# Patient Record
Sex: FEMALE | Race: WHITE | NOT HISPANIC OR LATINO | Employment: FULL TIME | URBAN - METROPOLITAN AREA
[De-identification: names, ages, dates, MRNs, and addresses within clinical notes are randomized per-mention and may not be internally consistent; named-entity substitution may affect disease eponyms.]

---

## 2017-05-03 ENCOUNTER — ALLSCRIPTS OFFICE VISIT (OUTPATIENT)
Dept: OTHER | Facility: OTHER | Age: 57
End: 2017-05-03

## 2017-05-03 ENCOUNTER — LAB REQUISITION (OUTPATIENT)
Dept: LAB | Facility: HOSPITAL | Age: 57
End: 2017-05-03
Payer: COMMERCIAL

## 2017-05-03 DIAGNOSIS — Z01.419 ENCOUNTER FOR GYNECOLOGICAL EXAMINATION WITHOUT ABNORMAL FINDING: ICD-10-CM

## 2017-05-03 PROCEDURE — 88175 CYTOPATH C/V AUTO FLUID REDO: CPT | Performed by: OBSTETRICS & GYNECOLOGY

## 2017-05-03 PROCEDURE — 87624 HPV HI-RISK TYP POOLED RSLT: CPT | Performed by: OBSTETRICS & GYNECOLOGY

## 2017-05-04 ENCOUNTER — GENERIC CONVERSION - ENCOUNTER (OUTPATIENT)
Dept: OTHER | Facility: OTHER | Age: 57
End: 2017-05-04

## 2017-05-08 LAB — HPV RRNA GENITAL QL NAA+PROBE: NORMAL

## 2017-05-09 ENCOUNTER — GENERIC CONVERSION - ENCOUNTER (OUTPATIENT)
Dept: OTHER | Facility: OTHER | Age: 57
End: 2017-05-09

## 2017-05-09 LAB
LAB AP GYN PRIMARY INTERPRETATION: NORMAL
Lab: NORMAL

## 2017-06-02 ENCOUNTER — OFFICE VISIT (OUTPATIENT)
Dept: URGENT CARE | Facility: MEDICAL CENTER | Age: 57
End: 2017-06-02
Payer: COMMERCIAL

## 2017-06-02 PROCEDURE — 99204 OFFICE O/P NEW MOD 45 MIN: CPT

## 2018-01-12 VITALS
SYSTOLIC BLOOD PRESSURE: 100 MMHG | BODY MASS INDEX: 17.41 KG/M2 | WEIGHT: 98.25 LBS | DIASTOLIC BLOOD PRESSURE: 60 MMHG | HEIGHT: 63 IN

## 2018-01-12 NOTE — RESULT NOTES
Verified Results  MAMMO SCREENING BILATERAL W 3D & CAD [id number] 11:07AM Sil Anthony Order Number: [id number]     Test Name Result Flag Reference   MAMMO SCREENING BILATERAL W 3D & CAD (Report)     Patient History:   Patient is postmenopausal    No known family history of cancer  Benign excisional biopsy of the left breast, 2009  Benign    excisional biopsy of the right breast, 2007  Taking estrogen for 5 months  Patient has never smoked  Patient's BMI is 17 7  Reason for exam: screening (asymptomatic)  Mammo Screening Bilateral W DBT and CAD: August 1, 2016 - Check    In #: [de-identified]   2D/3D Procedure   3D views: Bilateral MLO view(s) were taken  2D views: Bilateral CC view(s) were taken  Technologist: LEE ANN Sanchez (LEE ANN)(M)   Prior study comparison: January 16, 2013, mammogram  November 5, 2010, mammogram  October 25, 2010, mammogram  January 19, 2009, mammogram  December 15, 2008, mammogram  July 11, 2008,    mammogram  December 21, 2007, mammogram  November 30, 2007,    mammogram      The breast tissue is heterogeneously dense, potentially limiting    the sensitivity of mammography  Patient risk, included in this    report, assists in determining the appropriate screening regimen    (such as 3-D mammography or the inclusion of automated breast    ultrasound or MRI)  3-D mammography may also remain indicated as    screening  A combination of mediolateral oblique 3-D tomographic slices as    well as standard two-dimensional orthogonal images were obtained  No dominant soft tissue mass, architectural distortion or    suspicious calcifications are noted in either breast   The skin    and nipple structures are within normal limits  Scattered benign   appearing calcifications are noted  Prior biopsy clip noted    bilaterally  No significant changes when compared with prior studies       ASSESSMENT: BiRad:2 - Benign     Recommendation:   Routine screening mammogram of both breasts in 1 year  A    reminder letter will be scheduled  8-10% of cancers will be missed on mammography  Management of a    palpable abnormality must be based on clinical grounds  Patients    will be notified of their results via letter from our facility  Accredited by [address] [place] [address] of Radiology and FDA       Transcription Location: LEE ANN Cast 98: [id number]     Risk Value(s):   Tyrer-Cuzick 10 Year: 3 482%, Tyrer-Cuzick Lifetime: 9 487%,    Myriad Table: 1 5%, AUDREY 5 Year: 2 4%, NCI Lifetime: 13 4%

## 2018-01-29 ENCOUNTER — OFFICE VISIT (OUTPATIENT)
Dept: URGENT CARE | Facility: MEDICAL CENTER | Age: 58
End: 2018-01-29
Payer: COMMERCIAL

## 2018-01-29 VITALS
HEART RATE: 86 BPM | TEMPERATURE: 97.8 F | OXYGEN SATURATION: 98 % | HEIGHT: 63 IN | RESPIRATION RATE: 16 BRPM | SYSTOLIC BLOOD PRESSURE: 110 MMHG | DIASTOLIC BLOOD PRESSURE: 70 MMHG | BODY MASS INDEX: 15.95 KG/M2 | WEIGHT: 90 LBS

## 2018-01-29 DIAGNOSIS — J20.9 ACUTE BRONCHITIS, UNSPECIFIED ORGANISM: Primary | ICD-10-CM

## 2018-01-29 PROCEDURE — 99213 OFFICE O/P EST LOW 20 MIN: CPT | Performed by: PHYSICIAN ASSISTANT

## 2018-01-29 RX ORDER — LORATADINE 10 MG/1
10 TABLET ORAL
COMMUNITY
End: 2019-02-06 | Stop reason: ALTCHOICE

## 2018-01-29 RX ORDER — QUETIAPINE FUMARATE 25 MG/1
TABLET, FILM COATED ORAL
COMMUNITY
Start: 2018-01-16 | End: 2018-05-07 | Stop reason: ALTCHOICE

## 2018-01-29 RX ORDER — SERTRALINE HYDROCHLORIDE 100 MG/1
75 TABLET, FILM COATED ORAL ONCE
COMMUNITY
Start: 2018-01-11 | End: 2019-07-15

## 2018-01-29 RX ORDER — AZITHROMYCIN 250 MG/1
TABLET, FILM COATED ORAL
Qty: 6 TABLET | Refills: 0 | Status: SHIPPED | OUTPATIENT
Start: 2018-01-29 | End: 2018-02-02

## 2018-01-29 RX ORDER — ESTRADIOL 10 UG/1
INSERT VAGINAL AS NEEDED
COMMUNITY
Start: 2017-01-24 | End: 2018-10-13 | Stop reason: SDUPTHER

## 2018-01-29 RX ORDER — LORAZEPAM 0.5 MG/1
0.5 TABLET ORAL
COMMUNITY
Start: 2018-01-11 | End: 2018-05-07 | Stop reason: ALTCHOICE

## 2018-01-29 RX ORDER — BENZONATATE 100 MG/1
100 CAPSULE ORAL 3 TIMES DAILY PRN
Qty: 20 CAPSULE | Refills: 0 | Status: SHIPPED | OUTPATIENT
Start: 2018-01-29 | End: 2018-05-07 | Stop reason: ALTCHOICE

## 2018-01-29 NOTE — PROGRESS NOTES
Assessment/Plan:  1  Acute bronchitis  -take azithromycin/Tessalon Perles as directed  -increase fluids  -follow up with PCP if no improvement within 5 days  -go to ER with worsening symptoms, difficulty breathing or any signs of distress       Diagnoses and all orders for this visit:    Acute bronchitis, unspecified organism  -     azithromycin (ZITHROMAX) 250 mg tablet; Take 2 tablets today then 1 tablet daily x 4 days  -     benzonatate (TESSALON PERLES) 100 mg capsule; Take 1 capsule (100 mg total) by mouth 3 (three) times a day as needed for cough    Other orders  -     aspirin 81 MG tablet; Take by mouth  -     Multiple Vitamin (MULTIVITAMINS PO); Take by mouth  -     CycloSPORINE (RESTASIS OP); Apply to eye  -     Estradiol (VAGIFEM) 10 MCG TABS; Insert into the vagina  -     loratadine (CLARITIN) 10 mg tablet; Take 10 mg by mouth  -     LORazepam (ATIVAN) 0 5 mg tablet; Take 0 5 mg by mouth  -     QUEtiapine (SEROquel) 25 mg tablet; Take 1-2 tablets at night as needed for sleep  -     sertraline (ZOLOFT) 100 mg tablet; Take 100 mg by mouth          Subjective:      Patient ID: Valdez Mckee is a 59 y o  female  Patient is a 52-year-old female presents today for evaluation of a cough for the past 10 days  Patient states that it started as an upper respiratory infection however it has gotten worse  Patient states that yesterday and today she is bringing up green mucus  Patient states that she has tried taking over-the-counter medications without much relief  Patient states that she had a low-grade fever on Saturday  Patient also admits to having some nasal congestion as well  The patient states that she works at [employer]  Patient states that she had something similar a few months ago and was put on a Z-Jimmie which really improved her symptoms          The following portions of the patient's history were reviewed and updated as appropriate: past family history, past surgical history and problem list     Review of Systems   Constitutional: Negative for chills and fever  HENT: Positive for rhinorrhea  Negative for ear pain and sore throat  Eyes: Negative for discharge  Respiratory: Positive for cough  Negative for shortness of breath  Cardiovascular: Negative for chest pain  Gastrointestinal: Negative for abdominal pain  Musculoskeletal: Negative for arthralgias  Neurological: Negative for headaches  Objective:     Physical Exam   Constitutional: She is oriented to person, place, and time  She appears well-developed and well-nourished  No distress  HENT:   Head: Normocephalic and atraumatic  Right Ear: External ear normal    Left Ear: External ear normal    Nose: Nose normal    Mouth/Throat: Oropharynx is clear and moist  No oropharyngeal exudate  Eyes: Conjunctivae and EOM are normal  Pupils are equal, round, and reactive to light  Neck: Normal range of motion  Neck supple  Cardiovascular: Normal rate, regular rhythm and normal heart sounds  Exam reveals no gallop and no friction rub  No murmur heard  Pulmonary/Chest: Effort normal and breath sounds normal  She has no wheezes  She has no rales  Lymphadenopathy:     She has no cervical adenopathy  Neurological: She is alert and oriented to person, place, and time  Skin: Skin is warm and dry  Psychiatric: She has a normal mood and affect  Patient Instructions     Acute Bronchitis   AMBULATORY CARE:   Acute bronchitis  is swelling and irritation in the air passages of your lungs  This irritation may cause you to cough or have other breathing problems  Acute bronchitis often starts because of another illness, such as a cold or the flu  The illness spreads from your nose and throat to your windpipe and airways  Bronchitis is often called a chest cold  Acute bronchitis lasts about 3 to 6 weeks and is usually not a serious illness  Your cough can last for several weeks     You may have any of the following symptoms:   · A cough with sputum that may be clear, yellow, or green    · Feeling more tired than usual, and body aches    · A fever and chills    · Wheezing when you breathe    · A tight chest or pain when you breathe or cough  Seek care immediately if:   · You cough up blood  · Your lips or fingernails turn blue  · You feel like you are not getting enough air when you breathe  Contact your healthcare provider if:   · You have a fever  · Your breathing problems do not go away or get worse  · Your cough does not get better within 4 weeks  · You have questions or concerns about your condition or care  Self-care:   · Get more rest   Rest helps your body to heal  Slowly start to do more each day  Rest when you feel it is needed  · Avoid irritants in the air  Avoid chemicals, fumes, and dust  Wear a face mask if you must work around dust or fumes  Stay inside on days when air pollution levels are high  If you have allergies, stay inside when pollen counts are high  Do not use aerosol products, such as spray-on deodorant, bug spray, and hair spray  · Do not smoke or be around others who smoke  Nicotine and other chemicals in cigarettes and cigars damages the cilia that move mucus out of your lungs  Ask your healthcare provider for information if you currently smoke and need help to quit  E-cigarettes or smokeless tobacco still contain nicotine  Talk to your healthcare provider before you use these products  · Drink liquids as directed  Liquids help keep your air passages moist and help you cough up mucus  You may need to drink more liquids when you have acute bronchitis  Ask how much liquid to drink each day and which liquids are best for you  · Use a humidifier or vaporizer  Use a cool mist humidifier or a vaporizer to increase air moisture in your home  This may make it easier for you to breathe and help decrease your cough    Prevent acute bronchitis by doing the following: · Get the vaccinations you need  Ask your healthcare provider if you should get vaccinated against the flu or pneumonia  · Prevent the spread of germs  You can decrease your risk of acute bronchitis and other illnesses by doing the following:     [facility] your hands often with soap and water  Carry germ-killing hand lotion or gel with you  You can use the lotion or gel to clean your hands when soap and water are not available  ¨ Do not touch your eyes, nose, or mouth unless you have washed your hands first     ¨ Always cover your mouth when you cough to prevent the spread of germs  It is best to cough into a tissue or your shirt sleeve instead of into your hand  Ask those around you cover their mouths when they cough  ¨ Try to avoid people who have a cold or the flu  If you are sick, stay away from others as much as possible  Medicines: Your healthcare provider may  give you any of the following:  · Ibuprofen or acetaminophen  are medicines that help lower your fever  They are available without a doctor's order  Ask your healthcare provider which medicine is right for you  Ask how much to take and how often to take it  Follow directions  These medicines can cause stomach bleeding if not taken correctly  Ibuprofen can cause kidney damage  Do not take ibuprofen if you have kidney disease, an ulcer, or allergies to aspirin  Acetaminophen can cause liver damage  Do not take more than 4,000 milligrams in 24 hours  · Decongestants  help loosen mucus in your lungs and make it easier to cough up  This can help you breathe easier  · Cough suppressants  decrease your urge to cough  If your cough produces mucus, do not take a cough suppressant unless your healthcare provider tells you to  Your healthcare provider may suggest that you take a cough suppressant at night so you can rest     · Inhalers  may be given   Your healthcare provider may give you one or more inhalers to help you breathe easier and cough less  An inhaler gives your medicine to open your airways  Ask your healthcare provider to show you how to use your inhaler correctly  Follow up with your healthcare provider as directed:  Write down questions you have so you will remember to ask them during your follow-up visits  © 2017 [employer] Information is for End User's use only and may not be sold, redistributed or otherwise used for commercial purposes  All illustrations and images included in CareNotes® are the copyrighted property of [employer] , [employer]  or Hong Carranza  The above information is an  only  It is not intended as medical advice for individual conditions or treatments  Talk to your doctor, nurse or pharmacist before following any medical regimen to see if it is safe and effective for you

## 2018-01-29 NOTE — PATIENT INSTRUCTIONS

## 2018-05-07 ENCOUNTER — ANNUAL EXAM (OUTPATIENT)
Dept: GYNECOLOGY | Facility: CLINIC | Age: 58
End: 2018-05-07
Payer: COMMERCIAL

## 2018-05-07 VITALS
HEART RATE: 76 BPM | HEIGHT: 63 IN | WEIGHT: 99.8 LBS | DIASTOLIC BLOOD PRESSURE: 68 MMHG | BODY MASS INDEX: 17.68 KG/M2 | SYSTOLIC BLOOD PRESSURE: 112 MMHG

## 2018-05-07 DIAGNOSIS — Z12.31 ENCOUNTER FOR SCREENING MAMMOGRAM FOR BREAST CANCER: ICD-10-CM

## 2018-05-07 DIAGNOSIS — Z01.419 ENCOUNTER FOR ANNUAL ROUTINE GYNECOLOGICAL EXAMINATION: Primary | ICD-10-CM

## 2018-05-07 PROCEDURE — S0612 ANNUAL GYNECOLOGICAL EXAMINA: HCPCS | Performed by: OBSTETRICS & GYNECOLOGY

## 2018-05-07 NOTE — PROGRESS NOTES
Assessment/Plan:  Normal gynecological exam  CoTesting   Osteoporosis- observed, Rx would be declined  Vaginal Dryness- Vagifem 1/wk  Return to office in 1 year  Annual 3-D mammography  Exercise 3 days a week- she does 5  Calcium 1200 mg daily with vitamin D  Colonoscopy -   Diagnoses and all orders for this visit:    Encounter for annual routine gynecological examination    Encounter for screening mammogram for breast cancer          Subjective:      Patient ID: Paulette Garrido is a 59 y o  female  Serene Aubries is here for an annual visit  She has no gynecologic complaints  If the Vagifem is not used she experiences discharge and dryness  The following portions of the patient's history were reviewed and updated as appropriate:   She  has a past medical history of Migraine and Osteoporosis  PMH:  G3, P2; SAVD x 2, , , Spont Ab   Vulvitis 1/10- Lidex cream  R Breast Bx   L Stereotactic Bx-   Dry Eyes   Lyme's Disease   Osteoporosis- Boniva for 1 yr, d/c'd due to SE's- flu-like sx's   Declines BMD- would not treat  Dyspareunia/Vaginal Dryness-  Vagifem SE's 15, trial of Estrace ', back to Vagifem 1/wk   Reactive depression - skilled nursing/'s illness  Patient Active Problem List    Diagnosis Date Noted    Encounter for annual routine gynecological examination 2018    Encounter for screening mammogram for breast cancer 2018     She  has a past surgical history that includes [place] tooth extraction  Her family history includes ALS in her mother; Hypertension in her father  FH:  Mother  age 66 from [address], she also had hypercholesterolemia  Father had hypercholesterolemia   MGF- CVA  PGF- HD  PGM- Colon Ca  Several females have Osteoporosis- no Fx's  She  reports that she has never smoked  She has never used smokeless tobacco  She reports that she does not drink alcohol or use drugs  SH:  Internist   Tara Chuck Ruby Phillip has 2 children and expecting her 2rd, Jay Kang (who I delivered) is  and teaching in [facility], she is the debate   Went to [facility] and [place] for month each  The briefly moved to [place] in an apartment to be close to [facility]  Sarah Browne has adrenal insufficiency and associated depression  [facility] plans changed  Madina Lozano returned to [facility] working 3 days a week  Close friend is in hospice  Current Outpatient Prescriptions   Medication Sig Dispense Refill    aspirin 81 MG tablet Take by mouth      benzonatate (TESSALON PERLES) 100 mg capsule Take 1 capsule (100 mg total) by mouth 3 (three) times a day as needed for cough 20 capsule 0    CycloSPORINE (RESTASIS OP) Apply to eye      Estradiol (VAGIFEM) 10 MCG TABS Insert into the vagina      loratadine (CLARITIN) 10 mg tablet Take 10 mg by mouth      LORazepam (ATIVAN) 0 5 mg tablet Take 0 5 mg by mouth      Multiple Vitamin (MULTIVITAMINS PO) Take by mouth      QUEtiapine (SEROquel) 25 mg tablet Take 1-2 tablets at night as needed for sleep      sertraline (ZOLOFT) 100 mg tablet Take 100 mg by mouth       No current facility-administered medications for this visit  Current Outpatient Prescriptions on File Prior to Visit   Medication Sig    aspirin 81 MG tablet Take by mouth    benzonatate (TESSALON PERLES) 100 mg capsule Take 1 capsule (100 mg total) by mouth 3 (three) times a day as needed for cough    CycloSPORINE (RESTASIS OP) Apply to eye    Estradiol (VAGIFEM) 10 MCG TABS Insert into the vagina    loratadine (CLARITIN) 10 mg tablet Take 10 mg by mouth    LORazepam (ATIVAN) 0 5 mg tablet Take 0 5 mg by mouth    Multiple Vitamin (MULTIVITAMINS PO) Take by mouth    QUEtiapine (SEROquel) 25 mg tablet Take 1-2 tablets at night as needed for sleep    sertraline (ZOLOFT) 100 mg tablet Take 100 mg by mouth     No current facility-administered medications on file prior to visit        She is allergic to sulfa antibiotics and sulfamethoxazole-trimethoprim       Review of Systems   Constitutional: Negative for activity change, appetite change, fatigue and unexpected weight change  Eyes: Negative for visual disturbance  Respiratory: Negative for cough, chest tightness, shortness of breath and wheezing  Cardiovascular: Negative for chest pain, palpitations and leg swelling  Breast: Patient denies tenderness, nipple discharge, masses, or erythema  Gastrointestinal: Negative for abdominal distention, abdominal pain, blood in stool, constipation, diarrhea, nausea and vomiting  Endocrine: Negative for cold intolerance and heat intolerance  Genitourinary: Negative for decreased urine volume, difficulty urinating, dyspareunia, dysuria, frequency, hematuria, menstrual problem, pelvic pain, urgency, vaginal bleeding, vaginal discharge and vaginal pain  Musculoskeletal: Negative for arthralgias  Skin: Negative for rash  Neurological: Negative for weakness, light-headedness, numbness and headaches  Hematological: Does not bruise/bleed easily  Psychiatric/Behavioral: Negative for agitation, behavioral problems and sleep disturbance  The patient is not nervous/anxious  Objective: There were no vitals taken for this visit  Physical Exam   Constitutional: She is oriented to person, place, and time  She appears well-developed and well-nourished  HENT:   Head: Normocephalic and atraumatic  Eyes: Conjunctivae and EOM are normal  Pupils are equal, round, and reactive to light  Neck: Normal range of motion  Neck supple  No tracheal deviation present  No thyromegaly present  Cardiovascular: Normal rate, regular rhythm and normal heart sounds  No murmur heard  Pulmonary/Chest: Effort normal and breath sounds normal  No respiratory distress  She has no wheezes  Right breast exhibits no inverted nipple, no mass, no nipple discharge, no skin change and no tenderness   Left breast exhibits no inverted nipple, no mass, no nipple discharge, no skin change and no tenderness  Breasts are symmetrical    Abdominal: Soft  Bowel sounds are normal  She exhibits no distension and no mass  There is no tenderness  Genitourinary: Vagina normal and uterus normal  Rectal exam shows no external hemorrhoid  No breast swelling, tenderness, discharge or bleeding  There is no rash, tenderness or lesion on the right labia  There is no rash, tenderness or lesion on the left labia  Uterus is not deviated, not enlarged and not tender  Cervix exhibits no motion tenderness and no discharge  Right adnexum displays no mass, no tenderness and no fullness  Left adnexum displays no mass, no tenderness and no fullness  Genitourinary Comments: Physiologic atrophy   Musculoskeletal: Normal range of motion  Neurological: She is alert and oriented to person, place, and time  Skin: Skin is warm and dry  Psychiatric: She has a normal mood and affect  Her behavior is normal  Judgment and thought content normal    Nursing note and vitals reviewed

## 2018-08-13 ENCOUNTER — HOSPITAL ENCOUNTER (OUTPATIENT)
Dept: MAMMOGRAPHY | Facility: MEDICAL CENTER | Age: 58
Discharge: HOME | End: 2018-08-13
Payer: COMMERCIAL

## 2018-08-13 DIAGNOSIS — Z12.31 ENCOUNTER FOR SCREENING MAMMOGRAM FOR BREAST CANCER: ICD-10-CM

## 2018-08-13 PROCEDURE — 77067 SCR MAMMO BI INCL CAD: CPT

## 2018-08-13 PROCEDURE — 77063 BREAST TOMOSYNTHESIS BI: CPT

## 2018-10-12 ENCOUNTER — TELEPHONE (OUTPATIENT)
Dept: GYNECOLOGY | Facility: CLINIC | Age: 58
End: 2018-10-12

## 2018-10-12 NOTE — TELEPHONE ENCOUNTER
Pt called for a refill on vagifem 10 mcg tabs  [facility]  It is already in her chart under pharmacy   She was last seen 5/7/18 for an annual exam  Last refilled on 1/29/18

## 2018-10-13 DIAGNOSIS — N95.2 ATROPHIC VAGINITIS: Primary | ICD-10-CM

## 2018-10-13 RX ORDER — ESTRADIOL 10 UG/1
1 INSERT VAGINAL 2 TIMES WEEKLY
Qty: 13 TABLET | Refills: 3 | Status: SHIPPED | OUTPATIENT
Start: 2018-10-15 | End: 2020-06-29 | Stop reason: SDUPTHER

## 2019-01-14 ENCOUNTER — OFFICE VISIT (OUTPATIENT)
Dept: URGENT CARE | Facility: MEDICAL CENTER | Age: 59
End: 2019-01-14
Payer: COMMERCIAL

## 2019-01-14 ENCOUNTER — APPOINTMENT (OUTPATIENT)
Dept: RADIOLOGY | Facility: MEDICAL CENTER | Age: 59
End: 2019-01-14
Payer: COMMERCIAL

## 2019-01-14 VITALS
RESPIRATION RATE: 16 BRPM | BODY MASS INDEX: 18.29 KG/M2 | HEIGHT: 63 IN | TEMPERATURE: 101.7 F | DIASTOLIC BLOOD PRESSURE: 62 MMHG | OXYGEN SATURATION: 97 % | SYSTOLIC BLOOD PRESSURE: 134 MMHG | HEART RATE: 85 BPM | WEIGHT: 103.2 LBS

## 2019-01-14 DIAGNOSIS — R05.9 COUGH: ICD-10-CM

## 2019-01-14 DIAGNOSIS — R91.8 PULMONARY INFILTRATE IN RIGHT LUNG ON CHEST X-RAY: Primary | ICD-10-CM

## 2019-01-14 DIAGNOSIS — R50.9 FEVER, UNSPECIFIED FEVER CAUSE: ICD-10-CM

## 2019-01-14 PROCEDURE — G0383 LEV 4 HOSP TYPE B ED VISIT: HCPCS | Performed by: NURSE PRACTITIONER

## 2019-01-14 PROCEDURE — S9083 URGENT CARE CENTER GLOBAL: HCPCS | Performed by: NURSE PRACTITIONER

## 2019-01-14 PROCEDURE — 71046 X-RAY EXAM CHEST 2 VIEWS: CPT

## 2019-01-14 RX ORDER — LEVOFLOXACIN 750 MG/1
750 TABLET ORAL EVERY 24 HOURS
Qty: 7 TABLET | Refills: 0 | Status: SHIPPED | OUTPATIENT
Start: 2019-01-14 | End: 2019-01-21

## 2019-01-14 RX ORDER — ACETAMINOPHEN 325 MG/1
650 TABLET ORAL ONCE
Status: COMPLETED | OUTPATIENT
Start: 2019-01-14 | End: 2019-01-14

## 2019-01-14 RX ORDER — CETIRIZINE HYDROCHLORIDE 5 MG/1
5 TABLET, CHEWABLE ORAL DAILY
COMMUNITY
End: 2020-06-29

## 2019-01-14 RX ADMIN — ACETAMINOPHEN 650 MG: 325 TABLET ORAL at 09:15

## 2019-01-14 NOTE — PROGRESS NOTES
[email or web address]        NAME: Lita Paez is a 59 y o  female  : 1960    MRN: [id number]  DATE: 2019  TIME: 9:19 AM    Assessment and Plan   Pulmonary infiltrate in right lung on chest x-ray [R91 8]  1  Pulmonary infiltrate in right lung on chest x-ray  levofloxacin (LEVAQUIN) 750 mg tablet   2  Cough  XR chest pa & lateral   3  Fever, unspecified fever cause  acetaminophen (TYLENOL) tablet 650 mg    levofloxacin (LEVAQUIN) 750 mg tablet         Patient Instructions   Tylenol 650 mg given in office, tolerated well  In office chest x ray with suspected R middle/lower lobe infiltrate, await final read  Recommend course of antibiotics at this time  Does not wish to have cough syrup prescribed at this time  May alternate Tylenol and Ibuprofen as needed  Encourage fluids and rest    Saline nasal spray as needed  Humidify bedroom  Salt water gargles  Chloraseptic spray and lozenges as needed  Consider adding anti-histamines daily, ie  Claritin or Zyrtec  Complete course of antibiotics as directed  Follow up with PCP in 3-5 days  Proceed to  ER if symptoms worsen  Chief Complaint     Chief Complaint   Patient presents with    Cold Like Symptoms     Patient here with a dry cough and sinus congestion for 6 days  History of Present Illness       Patient presents in office with 6 days of fevers, chills, R sided chest discomfort (which has improved)  Now dry cough, congestion  Has been taking ibuprofen no prn meds taken today  + flu vaccine this season  No allergies  Nonsmoker  Review of Systems   Review of Systems   Constitutional: Positive for chills, fatigue and fever  HENT: Positive for congestion  Negative for ear pain, rhinorrhea, sinus pain, sinus pressure and sore throat  Respiratory: Positive for cough  Negative for chest tightness, shortness of breath and wheezing  Cardiovascular: Negative  Gastrointestinal: Negative      Musculoskeletal: Positive for myalgias  Skin: Negative for rash  Current Medications       Current Outpatient Prescriptions:     cetirizine (ZyrTEC) 5 MG chewable tablet, Chew 5 mg daily, Disp: , Rfl:     CycloSPORINE (RESTASIS OP), Apply to eye, Disp: , Rfl:     estradiol (VAGIFEM) 10 MCG TABS vaginal tablet, Insert 1 tablet (10 mcg total) into the vagina 2 (two) times a week, Disp: 13 tablet, Rfl: 3    Multiple Vitamin (MULTIVITAMINS PO), Take by mouth, Disp: , Rfl:     sertraline (ZOLOFT) 100 mg tablet, Take 150 mg by mouth once  , Disp: , Rfl:     aspirin 81 MG tablet, Take by mouth, Disp: , Rfl:     levofloxacin (LEVAQUIN) 750 mg tablet, Take 1 tablet (750 mg total) by mouth every 24 hours for 7 days, Disp: 7 tablet, Rfl: 0    loratadine (CLARITIN) 10 mg tablet, Take 10 mg by mouth, Disp: , Rfl:   No current facility-administered medications for this visit  Current Allergies     Allergies as of 01/14/2019 - Reviewed 01/14/2019   Allergen Reaction Noted    Sulfa antibiotics Nausea Only 08/09/2017    Sulfamethoxazole-trimethoprim  03/21/2016            The following portions of the patient's history were reviewed and updated as appropriate: allergies, current medications, past family history, past medical history, past social history, past surgical history and problem list      Past Medical History:   Diagnosis Date    Anxiety     Depression     Migraine     Osteoporosis        Past Surgical History:   Procedure Laterality Date    WISDOM TOOTH EXTRACTION      in her 17's       Family History   Problem Relation Age of Onset    ALS Mother     Hypertension Father          Medications have been verified  Objective   /62   Pulse 85   Temp (!) 101 7 °F (38 7 °C) (Tympanic)   Resp 16   Ht 5' 3" (1 6 m)   Wt 46 8 kg (103 lb 3 2 oz)   LMP  (LMP Unknown)   SpO2 97%   BMI 18 28 kg/m²        Physical Exam     Physical Exam   Constitutional: She is oriented to person, place, and time   Vital signs are normal  She appears well-developed and well-nourished  She is cooperative  Non-toxic appearance  She does not have a sickly appearance  She does not appear ill  No distress  HENT:   Head: Normocephalic and atraumatic  Right Ear: Hearing, tympanic membrane, external ear and ear canal normal    Left Ear: Hearing, tympanic membrane, external ear and ear canal normal    Nose: Mucosal edema and sinus tenderness present  No rhinorrhea  Right sinus exhibits maxillary sinus tenderness and frontal sinus tenderness  Left sinus exhibits maxillary sinus tenderness and frontal sinus tenderness  Mouth/Throat: Uvula is midline, oropharynx is clear and moist and mucous membranes are normal  Normal dentition  No oropharyngeal exudate  Eyes: Pupils are equal, round, and reactive to light  Conjunctivae, EOM and lids are normal  Right eye exhibits no discharge  Left eye exhibits no discharge  Neck: Trachea normal and normal range of motion  No thyroid mass and no thyromegaly present  Cardiovascular: Normal rate, regular rhythm, S1 normal, S2 normal, normal heart sounds, intact distal pulses and normal pulses  Exam reveals no gallop and no friction rub  No murmur heard  Pulmonary/Chest: Effort normal  No respiratory distress  She has decreased breath sounds  She has no wheezes  She has rhonchi in the right lower field  She has no rales  She exhibits no tenderness  Musculoskeletal: Normal range of motion  She exhibits no edema  Lymphadenopathy:     She has no cervical adenopathy  Neurological: She is alert and oriented to person, place, and time  Skin: Skin is warm and dry  No rash noted  She is not diaphoretic  Psychiatric: She has a normal mood and affect  Her behavior is normal  Thought content normal    Nursing note and vitals reviewed  I have reviewed the student's history and physical, I have personally examined the patient and agree with the above stated findings and plan

## 2019-01-14 NOTE — PATIENT INSTRUCTIONS
May alternate Tylenol and Ibuprofen as needed  Encourage fluids and rest    Saline nasal spray as needed  Humidify bedroom  Salt water gargles  Chloraseptic spray and lozenges as needed  Consider adding anti-histamines daily, ie  Claritin or Zyrtec  Complete course of antibiotics as directed  F/U with PCP if symptoms persist/worsen or go to nearest emergency department if any signs of distress  Bacterial Pneumonia   AMBULATORY CARE:   Bacterial pneumonia  is a lung infection caused by bacteria  Your lungs become inflamed and cannot work well  Bacterial pneumonia germs are easily spread when an infected person coughs, sneezes, or has close contact with others  Common symptoms include the following:   · Dry cough or coughing up mucus, which may be streaked with blood    · Fever or chills    · Shortness of breath, wheezing, or chest pain    · Feeling tired easily    · Fast heartbeat    · Headache, muscle pain, or abdominal pain or discomfort    · Trouble thinking clearly  Seek care immediately if:   · You are confused and cannot think clearly  · You are urinating less or not at all  · You cough up blood  · You have more trouble breathing, or your breathing seems faster than normal     · Your heart or pulse beats more than 100 times in 1 minute  · Your lips or fingernails turn blue  Contact your healthcare provider if:   · Your symptoms are the same or get worse 48 hours after you start antibiotics  · You cannot eat or have loss of appetite, nausea, or are vomiting  · You have questions or concerns about your condition or care  Treatment  depends on what caused your bacterial pneumonia and how bad your symptoms are  You may need any of the following:  · Antibiotics  help treat a bacterial infection  · Acetaminophen  decreases pain and fever  It is available without a doctor's order  Ask how much to take and how often to take it  Follow directions   Read the labels of all other medicines you are using to see if they also contain acetaminophen, or ask your doctor or pharmacist  Acetaminophen can cause liver damage if not taken correctly  Do not use more than 4 grams (4,000 milligrams) total of acetaminophen in one day  · NSAIDs , such as ibuprofen, help decrease swelling, pain, and fever  This medicine is available with or without a doctor's order  NSAIDs can cause stomach bleeding or kidney problems in certain people  If you take blood thinner medicine, always ask your healthcare provider if NSAIDs are safe for you  Always read the medicine label and follow directions  · Airway clearance techniques  are exercises to help remove mucus so you can breathe more easily  Your healthcare provider will show you how to do the exercises  These exercises may be used along with machines or devices to help decrease your symptoms  · Respiratory support  is given to help you breathe  You may receive oxygen to increase the level of oxygen in your blood  You may also need a machine to help you breathe  Manage your symptoms:   · Rest as needed  Rest often while you recover  Slowly start to do more each day  · Drink liquids as directed  Ask how much liquid to drink each day and which liquids are best for you  Liquids help thin your mucus, which may make it easier for you to cough it up  · Do not smoke  Avoid secondhand smoke  Smoking increases your risk for pneumonia  Smoking also makes it harder for you to get better after you have had pneumonia  Ask your healthcare provider for information if you currently smoke and need help to quit  E-cigarettes or smokeless tobacco still contain nicotine  Talk to your healthcare provider before you use these products  · Use a cool mist humidifier  to increase air moisture in your home  This may make it easier for you to breathe and help decrease your cough  · Keep your head elevated    You may be able to breathe better if you lie down with the head of your bed up  Prevent bacterial pneumonia:   · Prevent the spread of germs  Wash your hands often with soap and water  Use gel hand cleanser when there is no soap and water available  Do not touch your eyes, nose, or mouth unless you have washed your hands first  Cover your mouth when you cough  Cough into a tissue or your shirtsleeve so you do not spread germs from your hands  If you are sick, stay away from others as much as possible  · Limit alcohol  Women should limit alcohol to 1 drink a day  Men should limit alcohol to 2 drinks a day  A drink of alcohol is 12 ounces of beer, 5 ounces of wine, or 1½ ounces of liquor  · Ask about vaccines  You may need a vaccine to help prevent pneumonia  Get an influenza (flu) vaccine every year as soon as it becomes available  Follow up with your healthcare provider as directed:  Write down your questions so you remember to ask them during your visits  © 2017 [facility] Information is for End User's use only and may not be sold, redistributed or otherwise used for commercial purposes  All illustrations and images included in CareNotes® are the copyrighted property of A D A M , Inc  or Hong Carranza  The above information is an  only  It is not intended as medical advice for individual conditions or treatments  Talk to your doctor, nurse or pharmacist before following any medical regimen to see if it is safe and effective for you

## 2019-01-14 NOTE — LETTER
January 14, 2019     Patient: Aris Verduzco   YOB: 1960   Date of Visit: 1/14/2019       To Whom it May Concern:    Aris Verduzco was seen in my clinic on 1/14/2019  She may return to work on when afebrile x 24 hours  If you have any questions or concerns, please don't hesitate to call           Sincerely,          FABRICE Weaver        CC: No Recipients

## 2019-02-05 PROBLEM — F33.9 RECURRENT MAJOR DEPRESSIVE DISORDER (HCC): Status: ACTIVE | Noted: 2019-02-05

## 2019-02-05 PROBLEM — Z01.419 ENCOUNTER FOR ANNUAL ROUTINE GYNECOLOGICAL EXAMINATION: Status: RESOLVED | Noted: 2018-05-07 | Resolved: 2019-02-05

## 2019-02-05 PROBLEM — J30.9 ALLERGIC RHINITIS: Status: ACTIVE | Noted: 2019-02-05

## 2019-02-05 PROBLEM — H04.123 DRY EYE SYNDROME OF BOTH EYES: Status: ACTIVE | Noted: 2019-02-05

## 2019-02-05 PROBLEM — N95.2 ATROPHIC VAGINITIS: Status: ACTIVE | Noted: 2019-02-05

## 2019-02-05 PROBLEM — F33.9 RECURRENT MAJOR DEPRESSIVE DISORDER (HCC): Status: RESOLVED | Noted: 2019-02-05 | Resolved: 2019-02-05

## 2019-02-05 PROBLEM — Z12.31 ENCOUNTER FOR SCREENING MAMMOGRAM FOR BREAST CANCER: Status: RESOLVED | Noted: 2018-05-07 | Resolved: 2019-02-05

## 2019-02-06 ENCOUNTER — OFFICE VISIT (OUTPATIENT)
Dept: FAMILY MEDICINE CLINIC | Facility: CLINIC | Age: 59
End: 2019-02-06
Payer: COMMERCIAL

## 2019-02-06 VITALS
DIASTOLIC BLOOD PRESSURE: 70 MMHG | HEART RATE: 64 BPM | BODY MASS INDEX: 18.29 KG/M2 | HEIGHT: 63 IN | WEIGHT: 103.2 LBS | SYSTOLIC BLOOD PRESSURE: 120 MMHG

## 2019-02-06 DIAGNOSIS — N95.2 ATROPHIC VAGINITIS: ICD-10-CM

## 2019-02-06 DIAGNOSIS — J30.89 SEASONAL ALLERGIC RHINITIS DUE TO OTHER ALLERGIC TRIGGER: Primary | ICD-10-CM

## 2019-02-06 DIAGNOSIS — H04.123 DRY EYE SYNDROME OF BOTH EYES: ICD-10-CM

## 2019-02-06 DIAGNOSIS — F32.5 MAJOR DEPRESSIVE DISORDER WITH SINGLE EPISODE, IN FULL REMISSION (HCC): ICD-10-CM

## 2019-02-06 DIAGNOSIS — J18.9 PNEUMONIA OF RIGHT MIDDLE LOBE DUE TO INFECTIOUS ORGANISM: ICD-10-CM

## 2019-02-06 PROCEDURE — 99203 OFFICE O/P NEW LOW 30 MIN: CPT | Performed by: FAMILY MEDICINE

## 2019-02-06 PROCEDURE — 1036F TOBACCO NON-USER: CPT | Performed by: FAMILY MEDICINE

## 2019-02-06 PROCEDURE — 3008F BODY MASS INDEX DOCD: CPT | Performed by: FAMILY MEDICINE

## 2019-02-06 NOTE — PROGRESS NOTES
Assessment/Plan:    Initial visit to the our office for this pleasant and healthy 55-year-old white female  1  Allergic rhinitis - Zyrtec, as needed  2  Dry eyes - Restasis  3  Atrophic vaginitis - vagifem vaginal tablets  She sees gyn on a regular basis  4  Depression - patient is doing well and is weaning down on her Zoloft  She is managed by psychiatrist in [place]  Her mood is good  5  Pneumonia of right middle lobe in January - repeat chest x-ray in 2-3 months to document clearing  Labs were done for work recently, fasting blood sugar, lipids and A1c were normal   Mammogram, gyn exam and colonoscopy also up-to-date  Patient to return to office on an as needed basis  Chest x-ray ordered for March or April  Call with results  Subjective: pt is here as a new patient to establish care and follow up of a recent pneumonia~cd     Patient ID: Valdez Mckee is a 59 y o  female  55-year-old white female, in otherwise excellent health, presents today as a new patient  In January she was doing a lot of traveling and had extra family stressors, and developed pneumonia  She was treated with antibiotics and did well  She is here for follow-up and for repeat chest x-ray in the near future to document clearing  She also had an episode of depression last year when she moved to [place]  She is seeing a psychiatrist there and is weaning off of her Zoloft  She is feeling well  Her mood is good  Her gyn, mammo and colonoscopy are up-to-date  She had recent blood work done for work  Labs for work done 8/2018    FBS - 83  Total chol -188  HDL - 73  LDL - 104  Trig - 57    A1C - 5 4    Took Boniva for 2 years          The following portions of the patient's history were reviewed and updated as appropriate: allergies, current medications, past family history, past medical history, past social history, past surgical history and problem list     Review of Systems   Constitutional: See HPI   HENT: Negative for congestion, ear pain, mouth sores, sinus pressure and trouble swallowing  Eyes: Negative for discharge, redness and itching  Respiratory: Negative for apnea, cough, chest tightness, shortness of breath, wheezing and stridor  Patient had pneumonia in January  She is feeling much better  She will need a repeat chest x-ray to document clearing  Cardiovascular: Negative for chest pain, palpitations and leg swelling  Gastrointestinal: Negative for abdominal distention, abdominal pain, blood in stool, constipation, diarrhea, nausea and vomiting  Endocrine: Negative for cold intolerance and heat intolerance  Genitourinary: Negative for difficulty urinating, dysuria, flank pain and urgency  Musculoskeletal: Negative for arthralgias and myalgias  Skin: Negative for rash  Neurological: Negative for dizziness, seizures, syncope, speech difficulty, weakness, light-headedness, numbness and headaches  Hematological: Negative for adenopathy  Psychiatric/Behavioral: Negative for agitation, behavioral problems, confusion and sleep disturbance  The patient is not nervous/anxious  See HPI  Objective:  Vitals:    02/06/19 1032   BP: 120/70   BP Location: Left arm   Patient Position: Sitting   Cuff Size: Standard   Pulse: 64   Weight: 46 8 kg (103 lb 3 2 oz)   Height: 5' 3" (1 6 m)                Physical Exam   Constitutional: She is oriented to person, place, and time  She appears well-developed and well-nourished  No distress  HENT:   Head: Normocephalic and atraumatic  Right Ear: External ear normal    Left Ear: External ear normal    Eyes: Pupils are equal, round, and reactive to light  Conjunctivae and EOM are normal  No scleral icterus  Neck: Normal range of motion  Neck supple  Cardiovascular: Normal rate, regular rhythm, normal heart sounds and intact distal pulses  Exam reveals no gallop and no friction rub  No murmur heard    No carotid bruits appreciated  Pulmonary/Chest: Effort normal  No respiratory distress  She has no wheezes  She has no rales  She exhibits no tenderness  Abdominal: Soft  Bowel sounds are normal  She exhibits no distension and no mass  There is no tenderness  There is no rebound and no guarding  Musculoskeletal: Normal range of motion  She exhibits no edema, tenderness or deformity  Lymphadenopathy:     She has no cervical adenopathy  Neurological: She is alert and oriented to person, place, and time  She has normal reflexes  Skin: Skin is warm and dry  She is not diaphoretic  Psychiatric: She has a normal mood and affect  Her behavior is normal  Judgment and thought content normal    Nursing note and vitals reviewed

## 2019-05-12 PROBLEM — Z01.419 ENCOUNTER FOR ANNUAL ROUTINE GYNECOLOGICAL EXAMINATION: Status: ACTIVE | Noted: 2019-05-12

## 2019-05-12 PROBLEM — Z12.31 ENCOUNTER FOR SCREENING MAMMOGRAM FOR BREAST CANCER: Status: ACTIVE | Noted: 2019-05-12

## 2019-05-13 ENCOUNTER — ANNUAL EXAM (OUTPATIENT)
Dept: GYNECOLOGY | Facility: CLINIC | Age: 59
End: 2019-05-13
Payer: COMMERCIAL

## 2019-05-13 VITALS
SYSTOLIC BLOOD PRESSURE: 112 MMHG | WEIGHT: 103.4 LBS | DIASTOLIC BLOOD PRESSURE: 52 MMHG | HEIGHT: 63 IN | BODY MASS INDEX: 18.32 KG/M2

## 2019-05-13 DIAGNOSIS — N95.2 ATROPHIC VAGINITIS: ICD-10-CM

## 2019-05-13 DIAGNOSIS — Z12.31 ENCOUNTER FOR SCREENING MAMMOGRAM FOR BREAST CANCER: ICD-10-CM

## 2019-05-13 DIAGNOSIS — Z01.419 ENCOUNTER FOR ANNUAL ROUTINE GYNECOLOGICAL EXAMINATION: Primary | ICD-10-CM

## 2019-05-13 PROCEDURE — S0612 ANNUAL GYNECOLOGICAL EXAMINA: HCPCS | Performed by: OBSTETRICS & GYNECOLOGY

## 2019-05-13 RX ORDER — ESTRADIOL 10 UG/1
1 INSERT VAGINAL 2 TIMES WEEKLY
Qty: 26 TABLET | Refills: 3 | Status: SHIPPED | OUTPATIENT
Start: 2019-05-13 | End: 2020-06-29

## 2019-07-15 DIAGNOSIS — F32.9 REACTIVE DEPRESSION: Primary | ICD-10-CM

## 2019-07-15 RX ORDER — SERTRALINE HYDROCHLORIDE 25 MG/1
25 TABLET, FILM COATED ORAL DAILY
Qty: 90 TABLET | Refills: 0 | Status: SHIPPED | OUTPATIENT
Start: 2019-07-15 | End: 2020-06-29

## 2020-02-26 ENCOUNTER — OFFICE VISIT (OUTPATIENT)
Dept: FAMILY MEDICINE CLINIC | Facility: CLINIC | Age: 60
End: 2020-02-26
Payer: COMMERCIAL

## 2020-02-26 VITALS
TEMPERATURE: 99.2 F | WEIGHT: 101.4 LBS | SYSTOLIC BLOOD PRESSURE: 102 MMHG | BODY MASS INDEX: 17.96 KG/M2 | HEIGHT: 63 IN | DIASTOLIC BLOOD PRESSURE: 62 MMHG

## 2020-02-26 DIAGNOSIS — J11.1 INFLUENZA: Primary | ICD-10-CM

## 2020-02-26 PROCEDURE — 99214 OFFICE O/P EST MOD 30 MIN: CPT | Performed by: PHYSICIAN ASSISTANT

## 2020-02-26 PROCEDURE — 1036F TOBACCO NON-USER: CPT | Performed by: PHYSICIAN ASSISTANT

## 2020-02-26 PROCEDURE — 87631 RESP VIRUS 3-5 TARGETS: CPT | Performed by: PHYSICIAN ASSISTANT

## 2020-02-26 PROCEDURE — 3008F BODY MASS INDEX DOCD: CPT | Performed by: PHYSICIAN ASSISTANT

## 2020-02-26 RX ORDER — OSELTAMIVIR PHOSPHATE 75 MG/1
75 CAPSULE ORAL EVERY 12 HOURS SCHEDULED
Qty: 10 CAPSULE | Refills: 0 | Status: SHIPPED | OUTPATIENT
Start: 2020-02-26 | End: 2020-03-02

## 2020-02-26 NOTE — PROGRESS NOTES
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 know if she is influenza positive  In the meantime we will start empiric Tamiflu 75 mg twice daily for 5 days  Supportive care with ibuprofen and Delsym as necessary  Work note will be given through Friday  Patient not to return to work until Monday  Problem List Items Addressed This Visit     None      Visit Diagnoses     Influenza    -  Primary    Relevant Medications    oseltamivir (TAMIFLU) 75 mg capsule    Other Relevant Orders    Influenza A/B and RSV PCR                 Diagnoses and all orders for this visit:    Influenza  -     oseltamivir (TAMIFLU) 75 mg capsule; Take 1 capsule (75 mg total) by mouth every 12 (twelve) hours for 5 days  -     Cancel: Influenza A/B and RSV PCR; Future  -     Cancel: Influenza A/B and RSV PCR; Future  -     Influenza A/B and RSV PCR; Future              Subjective:      Patient ID: Semaj Delgado is a 62 y o  female  CC:    Chief Complaint   Patient presents with    Fever     this morning being 101    Sore Throat     Pt states symptoms started yesterday  HPI:      HPI:  This is a 40-year-old female who presents to the office with concerns over fever and cough and sore throat that started last night  She has been feeling very fatigued and achy  She has not had much relief with over-the-counter medication  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        The following portions of the patient's history were reviewed and updated as appropriate: allergies, current medications, past family history, past medical history, past social history, past surgical history and problem list       Review of Systems   Constitutional: Positive for chills, fatigue and fever  HENT: Positive for sore throat  Negative for congestion, ear pain and sinus pressure  Eyes: Negative for visual disturbance  Respiratory: Positive for cough  Negative for chest tightness and shortness of breath  Cardiovascular: Negative for chest pain and palpitations  Gastrointestinal: Negative for diarrhea, nausea and vomiting  Endocrine: Negative for polyuria  Genitourinary: Negative for dysuria and frequency  Musculoskeletal: Negative for arthralgias and myalgias  Skin: Negative for pallor and rash  Neurological: Negative for dizziness, weakness, light-headedness, numbness and headaches  Psychiatric/Behavioral: Negative for agitation, behavioral problems and sleep disturbance  All other systems reviewed and are negative  Data to review:       Objective:    Vitals:    02/26/20 1055   BP: 102/62   BP Location: Left arm   Patient Position: Sitting   Cuff Size: Adult   Temp: 99 2 °F (37 3 °C)   TempSrc: Tympanic   Weight: 46 kg (101 lb 6 4 oz)   Height: 5' 3" (1 6 m)        Physical Exam   Constitutional: She is oriented to person, place, and time  She appears well-developed and well-nourished  No distress  HENT:   Head: Normocephalic and atraumatic  Right Ear: External ear normal    Left Ear: External ear normal    Nose: Nose normal    Mouth/Throat: Oropharynx is clear and moist  No oropharyngeal exudate  Eyes: Pupils are equal, round, and reactive to light  Conjunctivae and EOM are normal    Neck: Normal range of motion  Neck supple  No tracheal deviation present  No thyromegaly present  Cardiovascular: Normal rate, regular rhythm and normal heart sounds  Exam reveals no friction rub  No murmur heard  Pulmonary/Chest: Effort normal and breath sounds normal  No respiratory distress  She has no wheezes  She has no rales  Abdominal: Soft  Bowel sounds are normal  She exhibits no distension  There is no tenderness  There is no rebound and no guarding  Musculoskeletal: Normal range of motion  She exhibits no edema or tenderness  Lymphadenopathy:     She has no cervical adenopathy  Neurological: She is alert and oriented to person, place, and time  No cranial nerve deficit  Coordination normal    Skin: Skin is warm and dry  No rash noted  No erythema  Psychiatric: She has a normal mood and affect  Her behavior is normal  Thought content normal    Nursing note and vitals reviewed  BMI Counseling: Body mass index is 17 96 kg/m²  The BMI is below normal  Patient was advised to gain weight

## 2020-02-26 NOTE — PATIENT INSTRUCTIONS
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

## 2020-02-27 LAB
FLUAV RNA NPH QL NAA+PROBE: NORMAL
FLUBV RNA NPH QL NAA+PROBE: NORMAL
RSV RNA NPH QL NAA+PROBE: NORMAL

## 2020-03-02 ENCOUNTER — TELEPHONE (OUTPATIENT)
Dept: FAMILY MEDICINE CLINIC | Facility: CLINIC | Age: 60
End: 2020-03-02

## 2020-03-02 ENCOUNTER — OFFICE VISIT (OUTPATIENT)
Dept: URGENT CARE | Facility: MEDICAL CENTER | Age: 60
End: 2020-03-02
Payer: COMMERCIAL

## 2020-03-02 VITALS
DIASTOLIC BLOOD PRESSURE: 66 MMHG | WEIGHT: 101 LBS | SYSTOLIC BLOOD PRESSURE: 119 MMHG | TEMPERATURE: 100.3 F | HEART RATE: 96 BPM | BODY MASS INDEX: 17.89 KG/M2 | RESPIRATION RATE: 18 BRPM | HEIGHT: 63 IN | OXYGEN SATURATION: 98 %

## 2020-03-02 DIAGNOSIS — B34.9 VIRAL SYNDROME: Primary | ICD-10-CM

## 2020-03-02 PROCEDURE — G0382 LEV 3 HOSP TYPE B ED VISIT: HCPCS | Performed by: FAMILY MEDICINE

## 2020-03-02 PROCEDURE — S9083 URGENT CARE CENTER GLOBAL: HCPCS | Performed by: FAMILY MEDICINE

## 2020-03-02 RX ORDER — PREDNISONE 20 MG/1
TABLET ORAL
Qty: 18 TABLET | Refills: 0 | Status: SHIPPED | OUTPATIENT
Start: 2020-03-02 | End: 2020-06-29

## 2020-03-02 RX ORDER — BENZONATATE 200 MG/1
200 CAPSULE ORAL 3 TIMES DAILY PRN
Qty: 20 CAPSULE | Refills: 0 | Status: SHIPPED | OUTPATIENT
Start: 2020-03-02 | End: 2020-06-29

## 2020-03-02 RX ORDER — ALBUTEROL SULFATE 90 UG/1
2 AEROSOL, METERED RESPIRATORY (INHALATION) EVERY 6 HOURS PRN
Qty: 1 INHALER | Refills: 0 | Status: SHIPPED | OUTPATIENT
Start: 2020-03-02 | End: 2020-06-30 | Stop reason: ALTCHOICE

## 2020-03-02 NOTE — LETTER
March 2, 2020 3/4/2020      Patient: Eric Zurita   YOB: 1960   Date of Visit: 3/2/2020       To Whom It May Concern: It is my medical opinion that Eric Zurita may return to work on 3/4/2020  If you have any questions or concerns, please don't hesitate to call           Sincerely,        Buddy Bergman MD    CC: No Recipients

## 2020-03-02 NOTE — TELEPHONE ENCOUNTER
----- Message from Doris Mcgregor LPN sent at 3/7/2659  6:43 AM EST -----  Regarding: FW: Non-Urgent Medical Question  Contact: [phone]      ----- Message -----  From: Danae Ziegler  Sent: 3/1/2020   3:56 PM EST  To: [facility]  Subject: Non-Urgent Medical Question                      Hi Killian/ Nader Sheets,    I felt better on Thursday and went back to work for a half day on Friday ( bad idea) by Friday PM I had a low grade fever again 99- 100 2 even taking tylenol and motrin that has persisted through Sunday  I also have a very bad cough that is mildly productive even with mucinex  Will call your office Monday AM for direction

## 2020-03-02 NOTE — PROGRESS NOTES
[address] [facility]        NAME: Shagufta Rodriguez is a 62 y o  female  : 1960    MRN: [id number]  DATE: 2020  TIME: 12:42 PM    Assessment and Plan   Viral syndrome [B34 9]  1  Viral syndrome  albuterol (PROVENTIL HFA,VENTOLIN HFA) 90 mcg/act inhaler    benzonatate (TESSALON) 200 MG capsule    predniSONE (Deltasone) 20 mg tablet         Patient Instructions       Follow up with PCP in 3-5 days  Proceed to  ER if symptoms worsen  Chief Complaint     Chief Complaint   Patient presents with   Russ Molly Like Symptoms     Patient relates she just returned back home from [place] [employer] on Monday  On Tuesday started with flu-like symptoms cough, congestion, body aches, fever 100 4 max  She was seen by Dr Rafael Aguilar office by Jolly Nguyen PA-C and diagnosed with influenza started on Tamiflu  She finished 5 day course of Tamiflu  States she had influenza swab and RSV done and both were negative  Thinks she last took Tylenol at 4:00 or 5:00 am today  History of Present Illness       64-year-old female here today with flu-like symptoms for the past week  Patient return from a trip from [place] [employer] on   Following day on the  she developed fever, body aches chills  She was seen by her primary care provider on  flu perform flu test and patient the patient empirically on Tamiflu  Her flu test results were negative  However she has just completed a five-day course of Tamiflu yesterday  Her symptoms still persist, seem worse  Mainly complaining of chest congestion, some shortness of breath  Cough at times productive  Currently taking Mucinex with no noticeable improvement  Refers to some mild nasal congestion  Denies postnasal drip or sore throat  Denies any GI symptoms such as nausea, vomiting or diarrhea  She is tolerating fluids but has had poor appetite  Claims to have fever as high as 101 100 to a currently taking Tylenol ibuprofen        Review of Systems   Review of Systems Constitutional: Positive for fatigue and fever  HENT: Positive for congestion  Respiratory: Positive for cough and shortness of breath  Cardiovascular: Negative  Neurological: Positive for headaches           Current Medications       Current Outpatient Medications:     estradiol (VAGIFEM) 10 MCG TABS vaginal tablet, Insert 1 tablet (10 mcg total) into the vagina 2 (two) times a week, Disp: 13 tablet, Rfl: 3    Multiple Vitamin (MULTIVITAMINS PO), Take by mouth, Disp: , Rfl:     oseltamivir (TAMIFLU) 75 mg capsule, Take 1 capsule (75 mg total) by mouth every 12 (twelve) hours for 5 days, Disp: 10 capsule, Rfl: 0    albuterol (PROVENTIL HFA,VENTOLIN HFA) 90 mcg/act inhaler, Inhale 2 puffs every 6 (six) hours as needed for wheezing, Disp: 1 Inhaler, Rfl: 0    benzonatate (TESSALON) 200 MG capsule, Take 1 capsule (200 mg total) by mouth 3 (three) times a day as needed for cough, Disp: 20 capsule, Rfl: 0    cetirizine (ZyrTEC) 5 MG chewable tablet, Chew 5 mg daily, Disp: , Rfl:     CycloSPORINE (RESTASIS OP), Apply to eye, Disp: , Rfl:     estradiol (VAGIFEM, YUVAFEM) 10 MCG TABS vaginal tablet, Insert 1 tablet (10 mcg total) into the vagina 2 (two) times a week for 90 days (Patient not taking: Reported on 2/26/2020), Disp: 26 tablet, Rfl: 3    predniSONE (Deltasone) 20 mg tablet, Take 3 tablets for 3 days then 2 tablets for 3 days then 1 tablet for 3 days then stop, Disp: 18 tablet, Rfl: 0    sertraline (ZOLOFT) 25 mg tablet, Take 1 tablet (25 mg total) by mouth daily (Patient not taking: Reported on 2/26/2020), Disp: 90 tablet, Rfl: 0    Current Allergies     Allergies as of 03/02/2020 - Reviewed 03/02/2020   Allergen Reaction Noted    Sulfa antibiotics Nausea Only 08/09/2017    Sulfamethoxazole-trimethoprim  03/21/2016            The following portions of the patient's history were reviewed and updated as appropriate: allergies, current medications, past family history, past medical history, past social history, past surgical history and problem list      Past Medical History:   Diagnosis Date    Anxiety     Depression     Migraine     Osteoporosis        Past Surgical History:   Procedure Laterality Date    WISDOM TOOTH EXTRACTION      in her 19's       Family History   Problem Relation Age of Onset   Jerry Spinalexandra ALS Mother     Hypertension Father     Stroke Maternal Grandfather     Diabetes Paternal Grandmother     Colon cancer Paternal Grandmother     Heart attack Paternal Grandfather          Medications have been verified  Objective   /66   Pulse 96   Temp 100 3 °F (37 9 °C) (Tympanic)   Resp 18   Ht 5' 3" (1 6 m)   Wt 45 8 kg (101 lb)   LMP  (LMP Unknown)   SpO2 98%   BMI 17 89 kg/m²        Physical Exam     Physical Exam   HENT:   Right Ear: External ear normal    Left Ear: External ear normal    Mouth/Throat: Oropharynx is clear and moist    Erythematous hypertrophic right turbinates  Pulmonary/Chest: Effort normal and breath sounds normal    Lymphadenopathy:     She has cervical adenopathy  Nursing note and vitals reviewed

## 2020-03-02 NOTE — PATIENT INSTRUCTIONS
I placed the patient empirically on Ventolin inhaler to use as needed, Tessalon Perles 200 mg q 8 hours, patient also started on prednisone tapering from 60 mg to 20 mg over 9 days  Advised patient to increase fluid intake  May continue Mucinex for expectoration  [facility] Respiratory nasopharyngeal panel taking today  Viral Syndrome   WHAT YOU NEED TO KNOW:   Viral syndrome is a term used for a viral infection that has no clear cause  Viruses are spread easily from person to person through the air and on shared items  DISCHARGE INSTRUCTIONS:   Call 911 for the following:   · You have a seizure  · You cannot be woken  · You have chest pain or trouble breathing  Return to the emergency department if:   · You have a stiff neck, a bad headache, and sensitivity to light  · You feel weak, dizzy, or confused  · You stop urinating or urinate a lot less than normal      · You cough up blood or thick, yellow or green, mucus  · You have severe abdominal pain or your abdomen is larger than usual   Contact your healthcare provider if:   · Your symptoms do not get better with treatment, or get worse, after 3 days  · You have a rash or ear pain  · You have burning when you urinate  · You have questions or concerns about your condition or care  Medicines: You may  need any of the following:  · Acetaminophen  decreases pain and fever  It is available without a doctor's order  Ask how much medicine to take and how often to take it  Follow directions  Acetaminophen can cause liver damage if not taken correctly  · NSAIDs , such as ibuprofen, help decrease swelling, pain, and fever  NSAIDs can cause stomach bleeding or kidney problems in certain people  If you take blood thinner medicine, always ask your healthcare provider if NSAIDs are safe for you  Always read the medicine label and follow directions      · Cold medicine  helps decrease swelling, control a cough, and relieve chest or nasal congestion  · Saline nasal spray  helps decrease nasal congestion  · Take your medicine as directed  Contact your healthcare provider if you think your medicine is not helping or if you have side effects  Tell him of her if you are allergic to any medicine  Keep a list of the medicines, vitamins, and herbs you take  Include the amounts, and when and why you take them  Bring the list or the pill bottles to follow-up visits  Carry your medicine list with you in case of an emergency  Manage your symptoms:   · Drink liquids as directed  to prevent dehydration  Ask how much liquid to drink each day and which liquids are best for you  Ask if you should drink an oral rehydration solution (ORS)  An ORS has the right amounts of water, salts, and sugar you need to replace body fluids  This may help prevent dehydration caused by vomiting or diarrhea  Do not drink liquids with caffeine  Drinks with caffeine can make dehydration worse  · Get plenty of rest  to help your body heal  Take naps throughout the day  Ask your healthcare provider when you can return to work and your normal activities  · Use a cool mist humidifier  to help you breathe easier if you have nasal or chest congestion  Ask your healthcare provider how to use a cool mist humidifier  · Eat honey or use cough drops  to help decrease throat discomfort  Ask your healthcare provider how much honey you should eat each day  Cough drops are available without a doctor's order  Follow directions for taking cough drops  · Do not smoke and stay away from others who smoke  Nicotine and other chemicals in cigarettes and cigars can cause lung damage  Smoking can also delay healing  Ask your healthcare provider for information if you currently smoke and need help to quit  E-cigarettes or smokeless tobacco still contain nicotine  Talk to your healthcare provider before you use these products       · Wash your hands frequently  to prevent the spread of germs to others  Use soap and water  Use gel hand  when soap and water are not available  Wash your hands after you use the bathroom, cough, or sneeze  Wash your hands before you prepare or eat food  Follow up with your healthcare provider as directed:  Write down your questions so you remember to ask them during your visits  © 2017 [employer]  Information is for End User's use only and may not be sold, redistributed or otherwise used for commercial purposes  All illustrations and images included in CareNotes® are the copyrighted property of [employer] , [employer]  or Hong Carranza  The above information is an  only  It is not intended as medical advice for individual conditions or treatments  Talk to your doctor, nurse or pharmacist before following any medical regimen to see if it is safe and effective for you

## 2020-03-02 NOTE — TELEPHONE ENCOUNTER
I had sent a task last week recommending that she go to the emergency room if it was persistent as she has had a history travel and should rule out the corona virus  I do not see any note in the chart that she has gone and would still recommend she do the same  Patient was traveling on a plane that came from [place]  I told her originally I did not think the virus could live that long on surfaces however there are now reports of it living up to 9 days on surfaces

## 2020-03-03 ENCOUNTER — TELEPHONE (OUTPATIENT)
Dept: URGENT CARE | Facility: MEDICAL CENTER | Age: 60
End: 2020-03-03

## 2020-03-03 ENCOUNTER — TELEPHONE (OUTPATIENT)
Dept: FAMILY MEDICINE CLINIC | Facility: CLINIC | Age: 60
End: 2020-03-03

## 2020-03-03 NOTE — TELEPHONE ENCOUNTER
Dr Camila Islas [facility] consulted via phone regarding this case  She affirms we are under no other obligation to report this case further at this time

## 2020-03-03 NOTE — TELEPHONE ENCOUNTER
Spoke to patient  She went to  urgent care yesterday and they gave her Prednisone and she is out of work for the rest of the week   She understands to go to the ER if symptoms worsen

## 2020-03-03 NOTE — LETTER
March 3, 2020     Patient: Kelley Cardenas   YOB: 1960   Date of Visit: 3/3/2020       To Whom It May Concern: It is my medical opinion that Kelley Cardenas may return to work on 3/6/2020  If you have any questions or concerns, please don't hesitate to call           Sincerely,        Yuusf Shipman MD    CC: No Recipients

## 2020-03-03 NOTE — TELEPHONE ENCOUNTER
I spoke with the patient to head form her that respiratory panel that was performed yesterday revealed negative results  She is aware of these results  She informs her symptoms are slightly better but she does not feel capable of returning to work  She is requesting extension on her work excuse until March 6   I a informed that I would attached letter to her chart which she can print out  She expressed understanding

## 2020-03-22 ENCOUNTER — NURSE TRIAGE (OUTPATIENT)
Dept: FAMILY MEDICINE CLINIC | Facility: CLINIC | Age: 60
End: 2020-03-22

## 2020-03-22 NOTE — TELEPHONE ENCOUNTER
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

## 2020-03-22 NOTE — TELEPHONE ENCOUNTER
Regarding: Coronavirus  ----- Message from Mir Simon sent at 3/22/2020  1:01 PM EDT -----  Feb 20th took a plane back to [address] from [facility] the plane was from [facility]  Feb 25th had a low grade fever  Went to [employer] one week later  Dry persistent cough about a month  Right sided chest pain

## 2020-03-22 NOTE — TELEPHONE ENCOUNTER
Reason for Disposition   [1] Caller concerned that exposure to COVID-19 occurred BUT [2] does not meet COVID-19 EXPOSURE criteria from [facility]    Answer Assessment - Initial Assessment Questions  1  CONFIRMED CASE: "Who is the person with the confirmed COVID-19 infection that you were exposed to?"      n/a  2  PLACE of CONTACT: "Where were you when you were exposed to COVID-19  (coronavirus disease 2019)?" (e g , city, state, country)      Flight from [facility] to [address] [place] [address] Rd[address] >14 days Feb 20  3  TYPE of CONTACT: "How much contact was there?" (e g , live in same house, work in same office, same school)      Plane travel, unkown  4  DATE of CONTACT: "When did you have contact with a coronavirus patient?" (e g , days)  n/a        5  DURATION of CONTACT: "How long were you in contact with the COVID-19 (coronavirus disease) patient?" (e g , a few seconds, passed by person, a few minutes, live with the patient)      N/a  6  SYMPTOMS: "Do you have any symptoms?" (e g , fever, cough, breathing difficulty)     Dry persistent cough, right side chest pain  7  PREGNANCY OR POSTPARTUM: "Is there any chance you are pregnant?" "When was your last menstrual period?" "Did you deliver in the last 2 weeks?"      n/a  8  HIGH RISK: "Do you have any heart or lung problems?  Do you have a weakened immune system?" (e g , CHF, COPD, asthma, HIV positive, chemotherapy, renal failure, diabetes mellitus, sickle cell anemia)      n/a    Protocols used: CORONAVIRUS (COVID-19) EXPOSURE-ADULT-

## 2020-03-23 ENCOUNTER — TELEPHONE (OUTPATIENT)
Dept: FAMILY MEDICINE CLINIC | Facility: CLINIC | Age: 60
End: 2020-03-23

## 2020-03-23 ENCOUNTER — APPOINTMENT (OUTPATIENT)
Dept: RADIOLOGY | Facility: MEDICAL CENTER | Age: 60
End: 2020-03-23
Payer: COMMERCIAL

## 2020-03-23 ENCOUNTER — TELEPHONE (OUTPATIENT)
Dept: FAMILY MEDICINE CLINIC | Facility: CLINIC | Age: 60
End: 2020-03-23
Payer: COMMERCIAL

## 2020-03-23 DIAGNOSIS — R05.9 COUGH: Primary | ICD-10-CM

## 2020-03-23 DIAGNOSIS — Z20.828 EXPOSURE TO SARS-ASSOCIATED CORONAVIRUS: ICD-10-CM

## 2020-03-23 DIAGNOSIS — R05.9 COUGH: ICD-10-CM

## 2020-03-23 DIAGNOSIS — Z20.828 EXPOSURE TO SARS-ASSOCIATED CORONAVIRUS: Primary | ICD-10-CM

## 2020-03-23 PROCEDURE — 87635 SARS-COV-2 COVID-19 AMP PRB: CPT | Performed by: FAMILY MEDICINE

## 2020-03-23 PROCEDURE — 71046 X-RAY EXAM CHEST 2 VIEWS: CPT

## 2020-03-23 PROCEDURE — 99213 OFFICE O/P EST LOW 20 MIN: CPT | Performed by: FAMILY MEDICINE

## 2020-03-23 NOTE — TELEPHONE ENCOUNTER
Phone call rec'd from [facility] ([facility]) radiology reporting significant findings on CXR  Wanted to make sure CB was aware  --bb

## 2020-03-23 NOTE — TELEPHONE ENCOUNTER
Call placed to patient to review CXR with R lingular density  LMOM  GARCIA  4:50pm    Spoke with patient later in the evening  She will continue her Z-Jimmie  She is already starting to feel better  She went for COVID 19 testing  She will quarantine herself until the results are received  Patient requests a note for work  Letter written and in chart

## 2020-03-23 NOTE — TELEPHONE ENCOUNTER
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  1  Recommend chest x-ray to rule out pneumonia  2  Recommend covert 19 testing due to travel history, signs and symptoms, and her profession  COVID-19 Virtual Visit     This virtual check-in was done via telephone  Encounter provider Rupa Osorio MD    Provider located at [address] [place] [address]    Recent Visits  No visits were found meeting these conditions  Showing recent visits within past 7 days and meeting all other requirements     Today's Visits  Date Type Provider Dept   03/23/20 Telephone Rupa Osorio MD [address] [place] [address] Primary Care   Showing today's visits and meeting all other requirements     Future Appointments  No visits were found meeting these conditions  Showing future appointments within next 150 days and meeting all other requirements        Patient agrees to participate in a virtual check in via telephone or video visit instead of presenting to the office to address urgent/immediate medical needs  Patient is aware this is a billable service  After connecting through telephone, the patient was identified by name and date of birth  Merly Peace was informed that this was a telemedicine visit and that the exam was being conducted confidentially over secure lines  My office door was closed  No one else was in the room  Palma Mejía acknowledged consent and understanding of privacy and security of the telemedicine visit  I informed the patient that I have reviewed her record in Epic and presented the opportunity for her to ask any questions regarding the visit today  The patient agreed to participate  Palma Mejía is a 56 y o  female who is concerned about COVID-19  She reports fever  She has traveled to [facility] within the last 14 days  She has not had contact with a person who is under investigation for or who is positive for COVID-19 within the last 14 days  She has not been hospitalized recently for fever and/or lower respiratory symptoms      Past Medical History:   Diagnosis Date    Anxiety     Depression     Migraine     Osteoporosis        Past Surgical History:   Procedure Laterality Date    WISDOM TOOTH EXTRACTION      in her 17's       Current Outpatient Medications   Medication Sig Dispense Refill    albuterol (PROVENTIL HFA,VENTOLIN HFA) 90 mcg/act inhaler Inhale 2 puffs every 6 (six) hours as needed for wheezing 1 Inhaler 0    benzonatate (TESSALON) 200 MG capsule Take 1 capsule (200 mg total) by mouth 3 (three) times a day as needed for cough 20 capsule 0    cetirizine (ZyrTEC) 5 MG chewable tablet Chew 5 mg daily      CycloSPORINE (RESTASIS OP) Apply to eye      estradiol (VAGIFEM) 10 MCG TABS vaginal tablet Insert 1 tablet (10 mcg total) into the vagina 2 (two) times a week 13 tablet 3    estradiol (VAGIFEM, YUVAFEM) 10 MCG TABS vaginal tablet Insert 1 tablet (10 mcg total) into the vagina 2 (two) times a week for 90 days (Patient not taking: Reported on 2/26/2020) 26 tablet 3    Multiple Vitamin (MULTIVITAMINS PO) Take by mouth      predniSONE (Deltasone) 20 mg tablet Take 3 tablets for 3 days then 2 tablets for 3 days then 1 tablet for 3 days then stop 18 tablet 0    sertraline (ZOLOFT) 25 mg tablet Take 1 tablet (25 mg total) by mouth daily (Patient not taking: Reported on 2/26/2020) 90 tablet 0     No current facility-administered medications for this visit  Allergies   Allergen Reactions    Sulfa Antibiotics Nausea Only    Sulfamethoxazole-Trimethoprim             Disposition:      I referred Aniya Mares to one of our centralized sites for a COVID-19 swab  I spent 15 minutes with the patient during this virtual check-in visit

## 2020-03-26 ENCOUNTER — TELEPHONE (OUTPATIENT)
Dept: FAMILY MEDICINE CLINIC | Facility: CLINIC | Age: 60
End: 2020-03-26

## 2020-03-26 DIAGNOSIS — J18.9 PNEUMONIA OF RIGHT MIDDLE LOBE DUE TO INFECTIOUS ORGANISM: Primary | ICD-10-CM

## 2020-03-26 LAB — SARS-COV-2 RNA SPEC QL NAA+PROBE: NOT DETECTED

## 2020-03-26 NOTE — TELEPHONE ENCOUNTER
Spoke to patient on the phone regarding Negative COVID testing  She is on Z-pack and feeling much better  She will need a repeat CXR in one month  Order placed  She is in agreement

## 2020-05-11 ENCOUNTER — TELEMEDICINE (OUTPATIENT)
Dept: FAMILY MEDICINE CLINIC | Facility: CLINIC | Age: 60
End: 2020-05-11
Payer: COMMERCIAL

## 2020-05-11 DIAGNOSIS — R68.89 FLU-LIKE SYMPTOMS: ICD-10-CM

## 2020-05-11 DIAGNOSIS — J18.9 PNEUMONIA DUE TO INFECTIOUS ORGANISM, UNSPECIFIED LATERALITY, UNSPECIFIED PART OF LUNG: Primary | ICD-10-CM

## 2020-05-11 DIAGNOSIS — J20.8 ACUTE BRONCHITIS DUE TO SEVERE ACUTE RESPIRATORY SYNDROME CORONAVIRUS 2 (SARS-COV-2): ICD-10-CM

## 2020-05-11 DIAGNOSIS — U07.1 ACUTE BRONCHITIS DUE TO SEVERE ACUTE RESPIRATORY SYNDROME CORONAVIRUS 2 (SARS-COV-2): ICD-10-CM

## 2020-05-11 PROCEDURE — 99213 OFFICE O/P EST LOW 20 MIN: CPT | Performed by: FAMILY MEDICINE

## 2020-05-13 ENCOUNTER — APPOINTMENT (OUTPATIENT)
Dept: RADIOLOGY | Facility: MEDICAL CENTER | Age: 60
End: 2020-05-13
Payer: COMMERCIAL

## 2020-05-13 ENCOUNTER — HOSPITAL ENCOUNTER (OUTPATIENT)
Dept: MAMMOGRAPHY | Facility: MEDICAL CENTER | Age: 60
Discharge: HOME | End: 2020-05-13
Payer: COMMERCIAL

## 2020-05-13 VITALS — HEIGHT: 63 IN | WEIGHT: 101 LBS | BODY MASS INDEX: 17.89 KG/M2

## 2020-05-13 DIAGNOSIS — Z12.31 ENCOUNTER FOR SCREENING MAMMOGRAM FOR BREAST CANCER: ICD-10-CM

## 2020-05-13 DIAGNOSIS — J18.9 PNEUMONIA OF RIGHT MIDDLE LOBE DUE TO INFECTIOUS ORGANISM: ICD-10-CM

## 2020-05-13 PROCEDURE — 77067 SCR MAMMO BI INCL CAD: CPT

## 2020-05-13 PROCEDURE — 77063 BREAST TOMOSYNTHESIS BI: CPT

## 2020-05-13 PROCEDURE — 71046 X-RAY EXAM CHEST 2 VIEWS: CPT

## 2020-05-18 ENCOUNTER — APPOINTMENT (OUTPATIENT)
Dept: LAB | Facility: MEDICAL CENTER | Age: 60
End: 2020-05-18
Payer: COMMERCIAL

## 2020-05-18 DIAGNOSIS — J18.9 PNEUMONIA DUE TO INFECTIOUS ORGANISM, UNSPECIFIED LATERALITY, UNSPECIFIED PART OF LUNG: ICD-10-CM

## 2020-05-18 DIAGNOSIS — R68.89 FLU-LIKE SYMPTOMS: ICD-10-CM

## 2020-05-18 PROCEDURE — 86769 SARS-COV-2 COVID-19 ANTIBODY: CPT

## 2020-05-19 LAB
SARS-COV-2 IGG SERPL QL IA: NEGATIVE
SARS-COV-2 IGG SERPL QL IA: NORMAL

## 2020-06-29 ENCOUNTER — ANNUAL EXAM (OUTPATIENT)
Dept: GYNECOLOGY | Facility: CLINIC | Age: 60
End: 2020-06-29
Payer: COMMERCIAL

## 2020-06-29 VITALS — HEIGHT: 63 IN | BODY MASS INDEX: 17.89 KG/M2

## 2020-06-29 DIAGNOSIS — N95.2 ATROPHIC VAGINITIS: ICD-10-CM

## 2020-06-29 DIAGNOSIS — Z12.4 SCREENING FOR CERVICAL CANCER: ICD-10-CM

## 2020-06-29 DIAGNOSIS — Z12.31 ENCOUNTER FOR SCREENING MAMMOGRAM FOR BREAST CANCER: ICD-10-CM

## 2020-06-29 DIAGNOSIS — Z01.419 ENCOUNTER FOR ANNUAL ROUTINE GYNECOLOGICAL EXAMINATION: Primary | ICD-10-CM

## 2020-06-29 PROCEDURE — G0145 SCR C/V CYTO,THINLAYER,RESCR: HCPCS | Performed by: PATHOLOGY

## 2020-06-29 PROCEDURE — G0124 SCREEN C/V THIN LAYER BY MD: HCPCS | Performed by: PATHOLOGY

## 2020-06-29 PROCEDURE — 87624 HPV HI-RISK TYP POOLED RSLT: CPT | Performed by: OBSTETRICS & GYNECOLOGY

## 2020-06-29 PROCEDURE — S0612 ANNUAL GYNECOLOGICAL EXAMINA: HCPCS | Performed by: OBSTETRICS & GYNECOLOGY

## 2020-06-29 RX ORDER — ESTRADIOL 10 UG/1
1 INSERT VAGINAL 2 TIMES WEEKLY
Qty: 13 TABLET | Refills: 3 | Status: SHIPPED | OUTPATIENT
Start: 2020-06-29 | End: 2021-07-19 | Stop reason: SDUPTHER

## 2020-06-30 ENCOUNTER — OFFICE VISIT (OUTPATIENT)
Dept: FAMILY MEDICINE CLINIC | Facility: CLINIC | Age: 60
End: 2020-06-30
Payer: COMMERCIAL

## 2020-06-30 VITALS
HEIGHT: 63 IN | TEMPERATURE: 98.4 F | HEART RATE: 64 BPM | WEIGHT: 100.8 LBS | DIASTOLIC BLOOD PRESSURE: 62 MMHG | SYSTOLIC BLOOD PRESSURE: 100 MMHG | BODY MASS INDEX: 17.86 KG/M2

## 2020-06-30 DIAGNOSIS — J18.9 PNEUMONIA DUE TO INFECTIOUS ORGANISM, UNSPECIFIED LATERALITY, UNSPECIFIED PART OF LUNG: ICD-10-CM

## 2020-06-30 DIAGNOSIS — Z00.00 ENCOUNTER FOR ANNUAL PHYSICAL EXAM: Primary | ICD-10-CM

## 2020-06-30 DIAGNOSIS — J18.9 PNEUMONIA OF RIGHT MIDDLE LOBE DUE TO INFECTIOUS ORGANISM: ICD-10-CM

## 2020-06-30 DIAGNOSIS — J30.89 SEASONAL ALLERGIC RHINITIS DUE TO OTHER ALLERGIC TRIGGER: ICD-10-CM

## 2020-06-30 DIAGNOSIS — Z23 ENCOUNTER FOR IMMUNIZATION: ICD-10-CM

## 2020-06-30 DIAGNOSIS — N95.2 ATROPHIC VAGINITIS: ICD-10-CM

## 2020-06-30 DIAGNOSIS — E55.9 VITAMIN D DEFICIENCY: ICD-10-CM

## 2020-06-30 PROCEDURE — 90471 IMMUNIZATION ADMIN: CPT | Performed by: FAMILY MEDICINE

## 2020-06-30 PROCEDURE — 90732 PPSV23 VACC 2 YRS+ SUBQ/IM: CPT | Performed by: FAMILY MEDICINE

## 2020-06-30 PROCEDURE — 3008F BODY MASS INDEX DOCD: CPT | Performed by: FAMILY MEDICINE

## 2020-06-30 PROCEDURE — 99396 PREV VISIT EST AGE 40-64: CPT | Performed by: FAMILY MEDICINE

## 2020-07-01 LAB
HPV HR 12 DNA CVX QL NAA+PROBE: NEGATIVE
HPV16 DNA CVX QL NAA+PROBE: NEGATIVE
HPV18 DNA CVX QL NAA+PROBE: NEGATIVE

## 2020-07-03 ENCOUNTER — APPOINTMENT (OUTPATIENT)
Dept: LAB | Facility: MEDICAL CENTER | Age: 60
End: 2020-07-03
Payer: COMMERCIAL

## 2020-07-03 DIAGNOSIS — E55.9 VITAMIN D DEFICIENCY: ICD-10-CM

## 2020-07-03 DIAGNOSIS — N95.2 ATROPHIC VAGINITIS: ICD-10-CM

## 2020-07-03 DIAGNOSIS — J30.89 SEASONAL ALLERGIC RHINITIS DUE TO OTHER ALLERGIC TRIGGER: ICD-10-CM

## 2020-07-03 DIAGNOSIS — Z00.00 ENCOUNTER FOR ANNUAL PHYSICAL EXAM: ICD-10-CM

## 2020-07-03 LAB
25(OH)D3 SERPL-MCNC: 18.9 NG/ML (ref 30–100)
ALBUMIN SERPL BCP-MCNC: 4.3 G/DL (ref 3.5–5)
ALP SERPL-CCNC: 108 U/L (ref 46–116)
ALT SERPL W P-5'-P-CCNC: 26 U/L (ref 12–78)
ANION GAP SERPL CALCULATED.3IONS-SCNC: 5 MMOL/L (ref 4–13)
AST SERPL W P-5'-P-CCNC: 22 U/L (ref 5–45)
BASOPHILS # BLD AUTO: 0.03 THOUSANDS/ΜL (ref 0–0.1)
BASOPHILS NFR BLD AUTO: 1 % (ref 0–1)
BILIRUB SERPL-MCNC: 0.79 MG/DL (ref 0.2–1)
BUN SERPL-MCNC: 19 MG/DL (ref 5–25)
CALCIUM SERPL-MCNC: 9.7 MG/DL (ref 8.3–10.1)
CHLORIDE SERPL-SCNC: 107 MMOL/L (ref 100–108)
CHOLEST SERPL-MCNC: 211 MG/DL (ref 50–200)
CO2 SERPL-SCNC: 28 MMOL/L (ref 21–32)
CREAT SERPL-MCNC: 0.88 MG/DL (ref 0.6–1.3)
EOSINOPHIL # BLD AUTO: 0.06 THOUSAND/ΜL (ref 0–0.61)
EOSINOPHIL NFR BLD AUTO: 2 % (ref 0–6)
ERYTHROCYTE [DISTWIDTH] IN BLOOD BY AUTOMATED COUNT: 12.9 % (ref 11.6–15.1)
GFR SERPL CREATININE-BSD FRML MDRD: 71 ML/MIN/1.73SQ M
GLUCOSE P FAST SERPL-MCNC: 92 MG/DL (ref 65–99)
HCT VFR BLD AUTO: 43.8 % (ref 34.8–46.1)
HDLC SERPL-MCNC: 83 MG/DL
HGB BLD-MCNC: 14.2 G/DL (ref 11.5–15.4)
IMM GRANULOCYTES # BLD AUTO: 0 THOUSAND/UL (ref 0–0.2)
IMM GRANULOCYTES NFR BLD AUTO: 0 % (ref 0–2)
LDLC SERPL CALC-MCNC: 116 MG/DL (ref 0–100)
LYMPHOCYTES # BLD AUTO: 1.2 THOUSANDS/ΜL (ref 0.6–4.47)
LYMPHOCYTES NFR BLD AUTO: 32 % (ref 14–44)
MCH RBC QN AUTO: 30.9 PG (ref 26.8–34.3)
MCHC RBC AUTO-ENTMCNC: 32.4 G/DL (ref 31.4–37.4)
MCV RBC AUTO: 95 FL (ref 82–98)
MONOCYTES # BLD AUTO: 0.43 THOUSAND/ΜL (ref 0.17–1.22)
MONOCYTES NFR BLD AUTO: 12 % (ref 4–12)
NEUTROPHILS # BLD AUTO: 2.03 THOUSANDS/ΜL (ref 1.85–7.62)
NEUTS SEG NFR BLD AUTO: 53 % (ref 43–75)
NONHDLC SERPL-MCNC: 128 MG/DL
NRBC BLD AUTO-RTO: 0 /100 WBCS
PLATELET # BLD AUTO: 129 THOUSANDS/UL (ref 149–390)
PMV BLD AUTO: 13.5 FL (ref 8.9–12.7)
POTASSIUM SERPL-SCNC: 4.6 MMOL/L (ref 3.5–5.3)
PROT SERPL-MCNC: 7.8 G/DL (ref 6.4–8.2)
RBC # BLD AUTO: 4.6 MILLION/UL (ref 3.81–5.12)
SODIUM SERPL-SCNC: 140 MMOL/L (ref 136–145)
T4 SERPL-MCNC: 7.2 UG/DL (ref 4.7–13.3)
TRIGL SERPL-MCNC: 58 MG/DL
TSH SERPL DL<=0.05 MIU/L-ACNC: 2.47 UIU/ML (ref 0.36–3.74)
WBC # BLD AUTO: 3.75 THOUSAND/UL (ref 4.31–10.16)

## 2020-07-03 PROCEDURE — 80053 COMPREHEN METABOLIC PANEL: CPT

## 2020-07-03 PROCEDURE — 85025 COMPLETE CBC W/AUTO DIFF WBC: CPT

## 2020-07-03 PROCEDURE — 84443 ASSAY THYROID STIM HORMONE: CPT

## 2020-07-03 PROCEDURE — 80061 LIPID PANEL: CPT

## 2020-07-03 PROCEDURE — 84436 ASSAY OF TOTAL THYROXINE: CPT

## 2020-07-03 PROCEDURE — 82306 VITAMIN D 25 HYDROXY: CPT

## 2020-07-03 PROCEDURE — 36415 COLL VENOUS BLD VENIPUNCTURE: CPT

## 2020-07-08 LAB
LAB AP GYN PRIMARY INTERPRETATION: ABNORMAL
Lab: ABNORMAL
PATH INTERP SPEC-IMP: ABNORMAL

## 2020-08-17 ENCOUNTER — TELEPHONE (OUTPATIENT)
Dept: FAMILY MEDICINE CLINIC | Facility: CLINIC | Age: 60
End: 2020-08-17

## 2020-08-17 DIAGNOSIS — Z13.9 VISIT FOR SCREENING: Primary | ICD-10-CM

## 2020-08-17 NOTE — TELEPHONE ENCOUNTER
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   Order will be placed  Pt made aware that may not be covered by insurance  Problem List Items Addressed This Visit     None        This virtual check-in was done via telephone  Disposition:      I referred Marlene Juarez to one of our centralized sites for a COVID-19 swab    I spent 5 minutes directly with the patient during this visit    Encounter provider Len Tanner MD    Provider located at [address] [place] [phone]    Recent Visits  No visits were found meeting these conditions  Showing recent visits within past 7 days and meeting all other requirements     Today's Visits  Date Type Provider Dept   08/17/20 Telephone Len Tanner  [address] Primary Care   Showing today's visits and meeting all other requirements     Future Appointments  No visits were found meeting these conditions  Showing future appointments within next 150 days and meeting all other requirements        Patient agrees to participate in a virtual check in via telephone or video visit instead of presenting to the office to address urgent/immediate medical needs  Patient is aware this is a billable service  After connecting through telephone, the patient was identified by name and date of birth  Obed Gerardo was informed that this was a telemedicine visit and that the exam was being conducted confidentially over secure lines  My office door was closed  No one else was in the room  Obed Gerardo acknowledged consent and understanding of privacy and security of the telemedicine visit  I informed the patient that I have reviewed her record in Epic and presented the opportunity for her to ask any questions regarding the visit today  The patient agreed to participate  Subjective  Obed Gerardo is a 56 y o  female who is concerned about COVID-19   She is going on vacation and requries a COVID test  She is a physician  No recent contacts  She reports no symptoms, requires screening  She has not experienced no symptoms, requires screening She has not had contact with a person who is under investigation for or who is positive for COVID-19 within the last 14 days  She has not been hospitalized recently for fever and/or lower respiratory symptoms  Past Medical History:   Diagnosis Date    Anxiety     Depression     Migraine     Osteoporosis        Past Surgical History:   Procedure Laterality Date    BREAST BIOPSY Right 2007    core bx benign    BREAST BIOPSY Left 2009    core bx benign    WISDOM TOOTH EXTRACTION      in her 20's       Current Outpatient Medications   Medication Sig Dispense Refill    estradiol (Vagifem) 10 MCG TABS vaginal tablet Insert 1 tablet (10 mcg total) into the vagina 2 (two) times a week (Patient taking differently: Insert 1 tablet into the vagina once a week ) 13 tablet 3    Multiple Vitamin (MULTIVITAMINS PO) Take by mouth       No current facility-administered medications for this visit  Allergies   Allergen Reactions    Sulfa Antibiotics Nausea Only    Sulfamethoxazole-Trimethoprim        [unfilled]    Video Exam    [email or web address]      VIRTUAL VISIT DISCLAIMER    Brayan Raines acknowledges that she has consented to an online visit or consultation  She understands that the online visit is based solely on information provided by her, and that, in the absence of a face-to-face physical evaluation by the physician, the diagnosis she receives is both limited and provisional in terms of accuracy and completeness  This is not intended to replace a full medical face-to-face evaluation by the physician  Brayan Raines understands and accepts these terms

## 2020-08-17 NOTE — TELEPHONE ENCOUNTER
----- Message from Meche Teague LPN sent at 1/13/5462  9:36 AM EDT -----  Regarding: FW: Prescription Question  Contact: [phone]    ----- Message -----  From: Perez Williamson  Sent: 8/17/2020   8:11 AM EDT  To: [facility]  Subject: Prescription Question                            I am going on vacation next week and need a negative COVID-19 test  Could you please put an order in for a COVID-19 test for me   Thank you,  Brando Joyce'

## 2021-04-12 ENCOUNTER — APPOINTMENT (OUTPATIENT)
Dept: LAB | Facility: MEDICAL CENTER | Age: 61
End: 2021-04-12
Payer: COMMERCIAL

## 2021-04-12 ENCOUNTER — TRANSCRIBE ORDERS (OUTPATIENT)
Dept: ADMINISTRATIVE | Facility: HOSPITAL | Age: 61
End: 2021-04-12

## 2021-04-12 DIAGNOSIS — U07.1 DIARRHEA DUE TO SEVERE ACUTE RESPIRATORY SYNDROME CORONAVIRUS 2 (SARS-COV-2) INFECTION: Primary | ICD-10-CM

## 2021-04-12 DIAGNOSIS — A08.39 DIARRHEA DUE TO SEVERE ACUTE RESPIRATORY SYNDROME CORONAVIRUS 2 (SARS-COV-2) INFECTION: Primary | ICD-10-CM

## 2021-04-12 DIAGNOSIS — U07.1 LAB TEST POSITIVE FOR DETECTION OF COVID-19 VIRUS: ICD-10-CM

## 2021-04-12 LAB
SARS-COV-2 IGG SERPL QL IA: REACTIVE
SARS-COV-2 IGG+IGM SERPL QL IA: REACTIVE

## 2021-04-12 PROCEDURE — 36415 COLL VENOUS BLD VENIPUNCTURE: CPT

## 2021-04-12 PROCEDURE — 86769 SARS-COV-2 COVID-19 ANTIBODY: CPT

## 2021-04-13 DIAGNOSIS — Z11.52 ENCOUNTER FOR SCREENING FOR COVID-19: Primary | ICD-10-CM

## 2021-05-26 ENCOUNTER — TELEPHONE (OUTPATIENT)
Dept: OBGYN CLINIC | Facility: CLINIC | Age: 61
End: 2021-05-26

## 2021-05-26 NOTE — TELEPHONE ENCOUNTER
----- Message from Lizz Jay sent at 5/26/2021  6:51 AM EDT -----  Regarding: Non-Urgent Medical Question  Contact: [phone]  My chart says I have a appointment with Dr Deena Frye on Sunday, July 4  This seems to be unlikely  Please let me know when my appointment is actually scheduled    Thank you,  Alex Sunshine

## 2021-05-26 NOTE — TELEPHONE ENCOUNTER
----- Message from Jackelyn Bae sent at 5/26/2021  6:51 AM EDT -----  Regarding: Non-Urgent Medical Question  Contact: [phone]  My chart says I have a appointment with Dr Katrin Pisano on Sunday, July 4  This seems to be unlikely  Please let me know when my appointment is actually scheduled    Thank you,  Navjot Pedroza

## 2021-06-29 ENCOUNTER — VBI (OUTPATIENT)
Dept: ADMINISTRATIVE | Facility: OTHER | Age: 61
End: 2021-06-29

## 2021-07-05 PROBLEM — Z12.31 ENCOUNTER FOR SCREENING MAMMOGRAM FOR BREAST CANCER: Status: RESOLVED | Noted: 2019-05-12 | Resolved: 2021-07-05

## 2021-07-05 PROBLEM — D69.6 THROMBOCYTOPENIA (HCC): Status: ACTIVE | Noted: 2021-07-05

## 2021-07-05 PROBLEM — Z01.419 ENCOUNTER FOR ANNUAL ROUTINE GYNECOLOGICAL EXAMINATION: Status: RESOLVED | Noted: 2019-05-12 | Resolved: 2021-07-05

## 2021-07-05 PROBLEM — E78.00 PURE HYPERCHOLESTEROLEMIA: Status: ACTIVE | Noted: 2021-07-05

## 2021-07-05 PROBLEM — E55.9 VITAMIN D DEFICIENCY: Status: ACTIVE | Noted: 2021-07-05

## 2021-07-06 ENCOUNTER — OFFICE VISIT (OUTPATIENT)
Dept: FAMILY MEDICINE CLINIC | Facility: CLINIC | Age: 61
End: 2021-07-06
Payer: COMMERCIAL

## 2021-07-06 VITALS
HEART RATE: 64 BPM | DIASTOLIC BLOOD PRESSURE: 72 MMHG | HEIGHT: 63 IN | SYSTOLIC BLOOD PRESSURE: 108 MMHG | WEIGHT: 99.8 LBS | BODY MASS INDEX: 17.68 KG/M2

## 2021-07-06 DIAGNOSIS — J30.89 SEASONAL ALLERGIC RHINITIS DUE TO OTHER ALLERGIC TRIGGER: ICD-10-CM

## 2021-07-06 DIAGNOSIS — E55.9 VITAMIN D DEFICIENCY: ICD-10-CM

## 2021-07-06 DIAGNOSIS — Z00.00 ENCOUNTER FOR ANNUAL PHYSICAL EXAM: Primary | ICD-10-CM

## 2021-07-06 DIAGNOSIS — E78.00 PURE HYPERCHOLESTEROLEMIA: ICD-10-CM

## 2021-07-06 DIAGNOSIS — J40 BRONCHITIS: ICD-10-CM

## 2021-07-06 DIAGNOSIS — Z12.31 ENCOUNTER FOR SCREENING MAMMOGRAM FOR BREAST CANCER: ICD-10-CM

## 2021-07-06 DIAGNOSIS — D69.6 THROMBOCYTOPENIA (HCC): ICD-10-CM

## 2021-07-06 DIAGNOSIS — H04.123 DRY EYE SYNDROME OF BOTH EYES: ICD-10-CM

## 2021-07-06 DIAGNOSIS — N95.2 ATROPHIC VAGINITIS: ICD-10-CM

## 2021-07-06 DIAGNOSIS — Z12.11 COLON CANCER SCREENING: ICD-10-CM

## 2021-07-06 PROCEDURE — 3008F BODY MASS INDEX DOCD: CPT | Performed by: FAMILY MEDICINE

## 2021-07-06 PROCEDURE — 1036F TOBACCO NON-USER: CPT | Performed by: FAMILY MEDICINE

## 2021-07-06 PROCEDURE — 3725F SCREEN DEPRESSION PERFORMED: CPT | Performed by: FAMILY MEDICINE

## 2021-07-06 PROCEDURE — 99396 PREV VISIT EST AGE 40-64: CPT | Performed by: FAMILY MEDICINE

## 2021-07-06 RX ORDER — AZITHROMYCIN 250 MG/1
TABLET, FILM COATED ORAL
Qty: 6 TABLET | Refills: 0 | Status: SHIPPED | OUTPATIENT
Start: 2021-07-06 | End: 2021-07-11

## 2021-07-06 NOTE — PROGRESS NOTES
Assessment and Plan:      Pleasant 43-year-old white female presents today for annual medical exam     1   Annual exam -  Healthy female    2  Healthcare maintenance - mammogram  Done in 2020 and patient prefers to go every 2 years, colonoscopy do  Patient referred to Colorectal     Patient has had a DEXA scan and declines any further imaging  She was on Boniva in the past but did not tolerate it due to  GI side effects  She will continue with weight-bearing exercise and vitamin-D 3 supplementation  3  Allergic rhinitis -  Symptomatic treatment, p r n     4  Atrophic vaginitis -  Continue with Vagifem vaginal tablets, as per Gynecology    5  Vitamin-D deficiency -  Continue vitamin D3 and check vitamin-D level    6  Hyperlipidemia -  Check lipid level    7  Thrombocytopenia -  Check  CBC    8  Patient has family in [facility] and visit several times a year  She is young grandchildren and it appears that every time she goes, she gets sick with bronchitis  She requests a prescription for a Z-Jimmie to take with her  Z-Jimmie ordered  9  BMI is on the low side, but her weight is stable and unchanged  She eats a healthy diet  labs ordered  Call with results  Subjective:      Patient ID: Giovanni Chavez is a 59 y o  female  CC:    Chief Complaint   Patient presents with    Physical Exam     Routine Physical Exam  kw       HPI:    Ophtho - Dr Rachelle Avila in [place]  Needs Colonoscopy -   Last was in May of 2011   last mammogram May 2020;  Patient goes every 2 years  Patient notes that each time she goes to visit her family in [facility] she gets bronchitis  She requests a Z-Jimmie prescription to take with her on her next trip        The following portions of the patient's history were reviewed and updated as appropriate: allergies, current medications, past family history, past medical history, past social history, past surgical history and problem list       Review of Systems   Constitutional: See HPI   HENT: Negative for congestion, ear pain, mouth sores, sinus pressure and trouble swallowing  Eyes: Negative for discharge, redness and itching  Respiratory: Negative for apnea, cough, chest tightness, shortness of breath, wheezing and stridor  Cardiovascular: Negative for chest pain, palpitations and leg swelling  Gastrointestinal: Negative for abdominal distention, abdominal pain, blood in stool, constipation, diarrhea, nausea and vomiting  Endocrine: Negative for cold intolerance and heat intolerance  Genitourinary: Negative for difficulty urinating, dysuria, flank pain and urgency  Musculoskeletal: Negative for arthralgias and myalgias  Skin: Negative for rash  Neurological: Negative for dizziness, seizures, syncope, speech difficulty, weakness, light-headedness, numbness and headaches  Hematological: Negative for adenopathy  Psychiatric/Behavioral: Negative for agitation, behavioral problems, confusion and sleep disturbance  The patient is not nervous/anxious  Data to review:       Objective:    Vitals:    07/06/21 0808   BP: 108/72   BP Location: Right arm   Patient Position: Sitting   Pulse: 64   Weight: 45 3 kg (99 lb 12 8 oz)   Height: 5' 3" (1 6 m)        Physical Exam  Vitals and nursing note reviewed  Constitutional:       General: She is not in acute distress  Appearance: She is well-developed  HENT:      Head: Normocephalic and atraumatic  Right Ear: Tympanic membrane and external ear normal       Left Ear: Tympanic membrane and external ear normal    Eyes:      General: No scleral icterus  Conjunctiva/sclera: Conjunctivae normal       Pupils: Pupils are equal, round, and reactive to light  Neck:      Vascular: No carotid bruit  Cardiovascular:      Rate and Rhythm: Normal rate and regular rhythm  Heart sounds: Normal heart sounds  No murmur heard  No friction rub  No gallop      Pulmonary:      Effort: Pulmonary effort is normal  No respiratory distress  Breath sounds: Normal breath sounds  No wheezing or rales  Abdominal:      General: Bowel sounds are normal  There is no distension  Palpations: Abdomen is soft  Tenderness: There is no abdominal tenderness  There is no right CVA tenderness, left CVA tenderness, guarding or rebound  Hernia: No hernia is present  Musculoskeletal:         General: No tenderness  Normal range of motion  Cervical back: Normal range of motion and neck supple  Right lower leg: No edema  Left lower leg: No edema  Lymphadenopathy:      Cervical: No cervical adenopathy  Skin:     General: Skin is warm and dry  Coloration: Skin is not jaundiced  Neurological:      Mental Status: She is alert and oriented to person, place, and time  Gait: Gait normal    Psychiatric:         Mood and Affect: Mood normal          Behavior: Behavior normal          Thought Content: Thought content normal          Judgment: Judgment normal            BMI Counseling: Body mass index is 17 68 kg/m²  The BMI is below normal  Patient advised to gain weight and dietary education for weight gain was provided  BMI Counseling: Body mass index is 17 68 kg/m²  The BMI is below normal  Patient was advised to gain weight

## 2021-07-09 ENCOUNTER — APPOINTMENT (OUTPATIENT)
Dept: LAB | Facility: MEDICAL CENTER | Age: 61
End: 2021-07-09
Payer: COMMERCIAL

## 2021-07-09 DIAGNOSIS — N95.2 ATROPHIC VAGINITIS: ICD-10-CM

## 2021-07-09 DIAGNOSIS — H04.123 DRY EYE SYNDROME OF BOTH EYES: ICD-10-CM

## 2021-07-09 DIAGNOSIS — E55.9 VITAMIN D DEFICIENCY: ICD-10-CM

## 2021-07-09 DIAGNOSIS — J30.89 SEASONAL ALLERGIC RHINITIS DUE TO OTHER ALLERGIC TRIGGER: ICD-10-CM

## 2021-07-09 DIAGNOSIS — E78.00 PURE HYPERCHOLESTEROLEMIA: ICD-10-CM

## 2021-07-09 DIAGNOSIS — D69.6 THROMBOCYTOPENIA (HCC): ICD-10-CM

## 2021-07-09 DIAGNOSIS — Z00.00 ENCOUNTER FOR ANNUAL PHYSICAL EXAM: ICD-10-CM

## 2021-07-09 LAB
25(OH)D3 SERPL-MCNC: 33.8 NG/ML (ref 30–100)
ALBUMIN SERPL BCP-MCNC: 3.9 G/DL (ref 3.5–5)
ALP SERPL-CCNC: 77 U/L (ref 46–116)
ALT SERPL W P-5'-P-CCNC: 28 U/L (ref 12–78)
ANION GAP SERPL CALCULATED.3IONS-SCNC: 2 MMOL/L (ref 4–13)
AST SERPL W P-5'-P-CCNC: 26 U/L (ref 5–45)
BASOPHILS # BLD AUTO: 0.03 THOUSANDS/ΜL (ref 0–0.1)
BASOPHILS NFR BLD AUTO: 1 % (ref 0–1)
BILIRUB SERPL-MCNC: 0.57 MG/DL (ref 0.2–1)
BUN SERPL-MCNC: 17 MG/DL (ref 5–25)
CALCIUM SERPL-MCNC: 9.6 MG/DL (ref 8.3–10.1)
CHLORIDE SERPL-SCNC: 106 MMOL/L (ref 100–108)
CHOLEST SERPL-MCNC: 184 MG/DL (ref 50–200)
CO2 SERPL-SCNC: 30 MMOL/L (ref 21–32)
CREAT SERPL-MCNC: 0.79 MG/DL (ref 0.6–1.3)
EOSINOPHIL # BLD AUTO: 0.05 THOUSAND/ΜL (ref 0–0.61)
EOSINOPHIL NFR BLD AUTO: 1 % (ref 0–6)
ERYTHROCYTE [DISTWIDTH] IN BLOOD BY AUTOMATED COUNT: 13.9 % (ref 11.6–15.1)
GFR SERPL CREATININE-BSD FRML MDRD: 80 ML/MIN/1.73SQ M
GLUCOSE P FAST SERPL-MCNC: 91 MG/DL (ref 65–99)
HCT VFR BLD AUTO: 39.2 % (ref 34.8–46.1)
HDLC SERPL-MCNC: 79 MG/DL
HGB BLD-MCNC: 12.8 G/DL (ref 11.5–15.4)
IMM GRANULOCYTES # BLD AUTO: 0.02 THOUSAND/UL (ref 0–0.2)
IMM GRANULOCYTES NFR BLD AUTO: 1 % (ref 0–2)
LDLC SERPL CALC-MCNC: 97 MG/DL (ref 0–100)
LYMPHOCYTES # BLD AUTO: 1 THOUSANDS/ΜL (ref 0.6–4.47)
LYMPHOCYTES NFR BLD AUTO: 25 % (ref 14–44)
MCH RBC QN AUTO: 31.2 PG (ref 26.8–34.3)
MCHC RBC AUTO-ENTMCNC: 32.7 G/DL (ref 31.4–37.4)
MCV RBC AUTO: 96 FL (ref 82–98)
MONOCYTES # BLD AUTO: 0.53 THOUSAND/ΜL (ref 0.17–1.22)
MONOCYTES NFR BLD AUTO: 13 % (ref 4–12)
NEUTROPHILS # BLD AUTO: 2.37 THOUSANDS/ΜL (ref 1.85–7.62)
NEUTS SEG NFR BLD AUTO: 59 % (ref 43–75)
NONHDLC SERPL-MCNC: 105 MG/DL
NRBC BLD AUTO-RTO: 0 /100 WBCS
PLATELET # BLD AUTO: 116 THOUSANDS/UL (ref 149–390)
PMV BLD AUTO: 13.5 FL (ref 8.9–12.7)
POTASSIUM SERPL-SCNC: 4.1 MMOL/L (ref 3.5–5.3)
PROT SERPL-MCNC: 7 G/DL (ref 6.4–8.2)
RBC # BLD AUTO: 4.1 MILLION/UL (ref 3.81–5.12)
SODIUM SERPL-SCNC: 138 MMOL/L (ref 136–145)
T4 SERPL-MCNC: 8.2 UG/DL (ref 4.7–13.3)
TRIGL SERPL-MCNC: 41 MG/DL
TSH SERPL DL<=0.05 MIU/L-ACNC: 2.87 UIU/ML (ref 0.36–3.74)
WBC # BLD AUTO: 4 THOUSAND/UL (ref 4.31–10.16)

## 2021-07-09 PROCEDURE — 36415 COLL VENOUS BLD VENIPUNCTURE: CPT

## 2021-07-09 PROCEDURE — 85025 COMPLETE CBC W/AUTO DIFF WBC: CPT

## 2021-07-09 PROCEDURE — 84436 ASSAY OF TOTAL THYROXINE: CPT

## 2021-07-09 PROCEDURE — 84443 ASSAY THYROID STIM HORMONE: CPT

## 2021-07-09 PROCEDURE — 82306 VITAMIN D 25 HYDROXY: CPT

## 2021-07-09 PROCEDURE — 80061 LIPID PANEL: CPT

## 2021-07-09 PROCEDURE — 80053 COMPREHEN METABOLIC PANEL: CPT

## 2021-07-12 ENCOUNTER — TELEPHONE (OUTPATIENT)
Dept: FAMILY MEDICINE CLINIC | Facility: CLINIC | Age: 61
End: 2021-07-12

## 2021-07-12 NOTE — TELEPHONE ENCOUNTER
LMOM stating that labs are excellent  Slight thrombocytopenia of ? Significance  As patient is feeling well and remainder is normal, would repeat in 6 months

## 2021-07-17 PROBLEM — Z01.419 ENCOUNTER FOR ANNUAL ROUTINE GYNECOLOGICAL EXAMINATION: Status: ACTIVE | Noted: 2021-07-17

## 2021-07-17 PROBLEM — Z12.31 ENCOUNTER FOR SCREENING MAMMOGRAM FOR BREAST CANCER: Status: ACTIVE | Noted: 2021-07-17

## 2021-07-17 NOTE — PROGRESS NOTES
Assessment/Plan:  Normal gynecological exam  CoTesting '20- ASCUS/HPV neg; repeat Pap alone '21  Osteoporosis- observed, Rx would be declined; poorly tolerated Boniva in 2012- declines another DEXA scan  Vaginal Dryness- Vagifem 1/wk- use 1/2 tab per wk  Return to office in 1 year  Annual 3-D mammography  Exercise 3 days a week- she does 5  Calcium 1200 mg daily with vitamin D  Colonoscopy - 2021- planned for later in the year         Diagnoses and all orders for this visit:    Encounter for annual routine gynecological examination  -     Liquid-based pap, screening    Encounter for screening mammogram for breast cancer  -     Mammo screening bilateral w 3d & cad; Future    Screening for cervical cancer  -     Liquid-based pap, screening    ASCUS of cervix with negative high risk HPV  -     Liquid-based pap, screening    Atrophic vaginitis  -     estradiol (Vagifem) 10 MCG TABS vaginal tablet; Insert 1 tablet (10 mcg total) into the vagina once a week    Other orders  -     Cancel: Ambulatory referral to Gastroenterology; Future  -     VITAMIN D, CHOLECALCIFEROL, PO; Take by mouth              Subjective:        Patient ID: Rommel Perez is a 59 y o  female  Grace Whittington is here for a yearly evaluation  She is without any gynecologic complaints  She has titrated the Vagifem to half a tablet weekly  She had an ASCUS Pap smear last year so this will be repeated  She remains sexually active  The following portions of the patient's history were reviewed and updated as appropriate: She  has a past medical history of Anxiety, Depression, Migraine, and Osteoporosis    Patient Active Problem List    Diagnosis Date Noted    Encounter for annual routine gynecological examination 07/17/2021    Encounter for screening mammogram for breast cancer 07/17/2021    Vitamin D deficiency 07/05/2021    Pure hypercholesterolemia 07/05/2021    Thrombocytopenia ([facility] [address] ) 07/05/2021    Allergic rhinitis 02/05/2019    Dry eye syndrome of both eyes 2019    Atrophic vaginitis 2019   PMH:  G3, P2; SAVD x 2, , , Spont Ab   Vulvitis 1/10- Lidex cream  R Breast Bx   L Stereotactic Bx-   Dry Eyes   Lyme's Disease   Osteoporosis- Boniva for 1 yr, d/c'd due to SE's- flu-like sx's   Declines BMD- would not treat  Dyspareunia/Vaginal Dryness-  Vagifem SE's '15, trial of Estrace , back to Vagifem 1/wk       Reactive depression - alf/'s illness       Pneumonia RLL       Bronchitis       Pneumonia 3/20  She  has a past surgical history that includes [place] tooth extraction; Breast biopsy (Right, ); and Breast biopsy (Left, )  Her family history includes ALS in her mother; Colon cancer in her paternal grandmother; Diabetes in her paternal grandmother; Heart attack in her paternal grandfather; Hyperlipidemia in her mother; Hypertension in her father; No Known Problems in her daughter, daughter, maternal aunt, maternal aunt, maternal grandmother, sister, and sister; Stroke in her maternal grandfather  FH:  Mother  age 66 from [address], she also had hypercholesterolemia  Father had hypercholesterolemia   MGF- CVA  PGF- HD  PGM- Colon Ca  MA- d 80 Parkinson's and dementia   Several females have Osteoporosis- no Fx's  She  reports that she has never smoked  She has never used smokeless tobacco  She reports current alcohol use  She reports that she does not use drugs  SH:  Internist   Parvin Banks has 3 children , Shahzad Mercado (who I delivered) is , teaching in [place] is the debate , and has 2 children  Michael Cuellarjhony and Shahzad Mercado live in [facility] about 40 minutes apart   Jose R survived cancer and has adrenal insufficiency and associated depression   [facility] plans changed  Ky Hong returned to [employer] working 3 days a week  Now it's 5d/wk  Parvin Leong has adjusted to alf  The visited the girls this past spring during the war    Current Outpatient Medications   Medication Sig Dispense Refill    estradiol (Vagifem) 10 MCG TABS vaginal tablet Insert 1 tablet (10 mcg total) into the vagina once a week 13 tablet 3    Multiple Vitamin (MULTIVITAMINS PO) Take by mouth      VITAMIN D, CHOLECALCIFEROL, PO Take by mouth       No current facility-administered medications for this visit  Current Outpatient Medications on File Prior to Visit   Medication Sig    Multiple Vitamin (MULTIVITAMINS PO) Take by mouth    VITAMIN D, CHOLECALCIFEROL, PO Take by mouth    [DISCONTINUED] estradiol (Vagifem) 10 MCG TABS vaginal tablet Insert 1 tablet (10 mcg total) into the vagina 2 (two) times a week (Patient taking differently: Insert 1 tablet into the vagina once a week )     No current facility-administered medications on file prior to visit  She is allergic to sulfa antibiotics and sulfamethoxazole-trimethoprim       Review of Systems   Constitutional: Negative for activity change, appetite change, chills, fatigue, fever and unexpected weight change  HENT: Negative for congestion, rhinorrhea, sinus pressure, sore throat and trouble swallowing  Eyes: Negative for discharge, redness, itching and visual disturbance  Respiratory: Negative for cough, chest tightness, shortness of breath and wheezing  Cardiovascular: Negative for chest pain, palpitations and leg swelling  Gastrointestinal: Negative for abdominal distention, abdominal pain, blood in stool, constipation, diarrhea, nausea and vomiting  Genitourinary: Negative for decreased urine volume, difficulty urinating, dyspareunia, dysuria, frequency, hematuria, menstrual problem, pelvic pain, urgency, vaginal bleeding, vaginal discharge and vaginal pain  [place] once a week using a lubricant  Musculoskeletal: Negative for arthralgias  Skin: Negative for rash  Neurological: Negative for dizziness, seizures, syncope, weakness, light-headedness, numbness and headaches     Hematological: Does not bruise/bleed easily  Psychiatric/Behavioral: Negative for agitation, behavioral problems and sleep disturbance  The patient is not nervous/anxious  Objective:    Vitals:    07/19/21 1410   BP: 108/60   Weight: 45 3 kg (99 lb 12 8 oz)            Physical Exam  Vitals and nursing note reviewed  Constitutional:       Appearance: She is well-developed  HENT:      Head: Normocephalic and atraumatic  Eyes:      General: No scleral icterus  Right eye: No discharge  Left eye: No discharge  Extraocular Movements: Extraocular movements intact  Conjunctiva/sclera: Conjunctivae normal    Neck:      Thyroid: No thyromegaly  Trachea: No tracheal deviation  Cardiovascular:      Rate and Rhythm: Normal rate and regular rhythm  Heart sounds: Normal heart sounds  No murmur heard  Pulmonary:      Effort: Pulmonary effort is normal  No respiratory distress  Breath sounds: Normal breath sounds  No wheezing  Chest:      Breasts: Breasts are symmetrical          Right: No inverted nipple, mass, nipple discharge, skin change or tenderness  Left: No inverted nipple, mass, nipple discharge, skin change or tenderness  Abdominal:      General: Bowel sounds are normal  There is no distension  Palpations: Abdomen is soft  There is no mass  Tenderness: There is no abdominal tenderness  There is no right CVA tenderness or left CVA tenderness  Genitourinary:     General: Normal vulva  Exam position: Supine  Labia:         Right: No rash, tenderness or lesion  Left: No rash, tenderness or lesion  Vagina: Normal       Cervix: No cervical motion tenderness or discharge  Uterus: Not deviated, not enlarged and not tender  Adnexa:         Right: No mass, tenderness or fullness  Left: No mass, tenderness or fullness  Rectum: No external hemorrhoid  Comments: Urethral meatus within normal limits    Perineum within normal limits  Bladder well supported  There is physiologic vaginal atrophy  The cervix is more on the patient's left of the midline  There is also a small cicatrix at 06:00 o'clock between the cervix and vagina  Musculoskeletal:         General: Normal range of motion  Cervical back: Normal range of motion and neck supple  Skin:     General: Skin is warm and dry  Neurological:      Mental Status: She is alert and oriented to person, place, and time  Psychiatric:         Mood and Affect: Mood normal          Behavior: Behavior normal          Thought Content:  Thought content normal          Judgment: Judgment normal

## 2021-07-19 ENCOUNTER — ANNUAL EXAM (OUTPATIENT)
Dept: OBGYN CLINIC | Facility: CLINIC | Age: 61
End: 2021-07-19
Payer: COMMERCIAL

## 2021-07-19 VITALS — SYSTOLIC BLOOD PRESSURE: 108 MMHG | DIASTOLIC BLOOD PRESSURE: 60 MMHG | BODY MASS INDEX: 17.68 KG/M2 | WEIGHT: 99.8 LBS

## 2021-07-19 DIAGNOSIS — Z12.31 ENCOUNTER FOR SCREENING MAMMOGRAM FOR BREAST CANCER: ICD-10-CM

## 2021-07-19 DIAGNOSIS — Z01.419 ENCOUNTER FOR ANNUAL ROUTINE GYNECOLOGICAL EXAMINATION: Primary | ICD-10-CM

## 2021-07-19 DIAGNOSIS — R87.610 ASCUS OF CERVIX WITH NEGATIVE HIGH RISK HPV: ICD-10-CM

## 2021-07-19 DIAGNOSIS — N95.2 ATROPHIC VAGINITIS: ICD-10-CM

## 2021-07-19 DIAGNOSIS — Z12.4 SCREENING FOR CERVICAL CANCER: ICD-10-CM

## 2021-07-19 PROCEDURE — 87624 HPV HI-RISK TYP POOLED RSLT: CPT | Performed by: OBSTETRICS & GYNECOLOGY

## 2021-07-19 PROCEDURE — S0612 ANNUAL GYNECOLOGICAL EXAMINA: HCPCS | Performed by: OBSTETRICS & GYNECOLOGY

## 2021-07-19 PROCEDURE — G0124 SCREEN C/V THIN LAYER BY MD: HCPCS | Performed by: PATHOLOGY

## 2021-07-19 PROCEDURE — G0145 SCR C/V CYTO,THINLAYER,RESCR: HCPCS | Performed by: PATHOLOGY

## 2021-07-19 RX ORDER — ESTRADIOL 10 UG/1
1 INSERT VAGINAL WEEKLY
Qty: 13 TABLET | Refills: 3 | Status: SHIPPED | OUTPATIENT
Start: 2021-07-19 | End: 2022-07-25 | Stop reason: SDUPTHER

## 2021-07-27 ENCOUNTER — TELEPHONE (OUTPATIENT)
Dept: OBGYN CLINIC | Facility: CLINIC | Age: 61
End: 2021-07-27

## 2021-07-27 LAB
LAB AP GYN PRIMARY INTERPRETATION: ABNORMAL
Lab: ABNORMAL
PATH INTERP SPEC-IMP: ABNORMAL

## 2021-07-27 NOTE — TELEPHONE ENCOUNTER
----- Message from Brenda Montero sent at 7/27/2021  1:48 PM EDT -----  Regarding: RE: Test Results Question  Contact: [phone]  Pap test results - see attached     ----- Message -----  From: Christiano Brenner  Sent: 7/27/21, 1:45 PM  To: Brenda Montero  Subject: RE: Test Results Question    What are your referring to?       ----- Message -----       From:Chinyere Dominguez       Sent:7/27/2021  1:44 PM EDT         To:Paul Monte MD    Subject:Test Results Question    Do I need to do anything further about this?

## 2021-12-10 ENCOUNTER — TELEPHONE (OUTPATIENT)
Dept: GASTROENTEROLOGY | Facility: HOSPITAL | Age: 61
End: 2021-12-10

## 2021-12-13 ENCOUNTER — ANESTHESIA (OUTPATIENT)
Dept: GASTROENTEROLOGY | Facility: HOSPITAL | Age: 61
End: 2021-12-13

## 2021-12-13 ENCOUNTER — HOSPITAL ENCOUNTER (OUTPATIENT)
Dept: GASTROENTEROLOGY | Facility: HOSPITAL | Age: 61
Setting detail: OUTPATIENT SURGERY
Discharge: HOME | End: 2021-12-13
Attending: COLON & RECTAL SURGERY | Admitting: COLON & RECTAL SURGERY
Payer: COMMERCIAL

## 2021-12-13 ENCOUNTER — ANESTHESIA EVENT (OUTPATIENT)
Dept: GASTROENTEROLOGY | Facility: HOSPITAL | Age: 61
End: 2021-12-13

## 2021-12-13 VITALS
SYSTOLIC BLOOD PRESSURE: 118 MMHG | OXYGEN SATURATION: 100 % | BODY MASS INDEX: 17.7 KG/M2 | HEART RATE: 63 BPM | WEIGHT: 99.87 LBS | RESPIRATION RATE: 14 BRPM | DIASTOLIC BLOOD PRESSURE: 68 MMHG | TEMPERATURE: 98.3 F | HEIGHT: 63 IN

## 2021-12-13 DIAGNOSIS — Z12.11 ENCOUNTER FOR SCREENING FOR MALIGNANT NEOPLASM OF COLON: ICD-10-CM

## 2021-12-13 PROCEDURE — 88305 TISSUE EXAM BY PATHOLOGIST: CPT | Performed by: PATHOLOGY

## 2021-12-13 RX ORDER — LIDOCAINE HYDROCHLORIDE 20 MG/ML
INJECTION, SOLUTION EPIDURAL; INFILTRATION; INTRACAUDAL; PERINEURAL AS NEEDED
Status: DISCONTINUED | OUTPATIENT
Start: 2021-12-13 | End: 2021-12-13

## 2021-12-13 RX ORDER — SODIUM CHLORIDE 9 MG/ML
125 INJECTION, SOLUTION INTRAVENOUS CONTINUOUS
Status: DISCONTINUED | OUTPATIENT
Start: 2021-12-13 | End: 2021-12-17 | Stop reason: HOSPADM

## 2021-12-13 RX ORDER — PROPOFOL 10 MG/ML
INJECTION, EMULSION INTRAVENOUS AS NEEDED
Status: DISCONTINUED | OUTPATIENT
Start: 2021-12-13 | End: 2021-12-13

## 2021-12-13 RX ADMIN — LIDOCAINE HYDROCHLORIDE 100 MG: 20 INJECTION, SOLUTION EPIDURAL; INFILTRATION; INTRACAUDAL; PERINEURAL at 10:03

## 2021-12-13 RX ADMIN — PROPOFOL 20 MG: 10 INJECTION, EMULSION INTRAVENOUS at 10:16

## 2021-12-13 RX ADMIN — PROPOFOL 130 MG: 10 INJECTION, EMULSION INTRAVENOUS at 10:03

## 2021-12-13 RX ADMIN — PROPOFOL 20 MG: 10 INJECTION, EMULSION INTRAVENOUS at 10:11

## 2021-12-13 RX ADMIN — SODIUM CHLORIDE 125 ML/HR: 0.9 INJECTION, SOLUTION INTRAVENOUS at 09:24

## 2022-05-09 ENCOUNTER — TELEPHONE (OUTPATIENT)
Dept: FAMILY MEDICINE CLINIC | Facility: CLINIC | Age: 62
End: 2022-05-09

## 2022-05-09 NOTE — TELEPHONE ENCOUNTER
----- Message from Samaria Monte sent at 5/9/2022 12:16 PM EDT -----  Regarding: Dexa scan  Hi- can you please order a Dexa scan for me  I decided I would follow up on my history of osteoporosis and would like to see where I stand with this  Thank you!

## 2022-05-27 ENCOUNTER — HOSPITAL ENCOUNTER (OUTPATIENT)
Dept: BONE DENSITY | Facility: MEDICAL CENTER | Age: 62
Discharge: HOME | End: 2022-05-27
Payer: COMMERCIAL

## 2022-05-27 ENCOUNTER — HOSPITAL ENCOUNTER (OUTPATIENT)
Dept: MAMMOGRAPHY | Facility: MEDICAL CENTER | Age: 62
Discharge: HOME | End: 2022-05-27
Payer: COMMERCIAL

## 2022-05-27 VITALS — HEIGHT: 63 IN | WEIGHT: 99 LBS | BODY MASS INDEX: 17.54 KG/M2

## 2022-05-27 DIAGNOSIS — Z12.31 VISIT FOR SCREENING MAMMOGRAM: ICD-10-CM

## 2022-05-27 DIAGNOSIS — Z78.0 ENCOUNTER FOR OSTEOPOROSIS SCREENING IN ASYMPTOMATIC POSTMENOPAUSAL PATIENT: ICD-10-CM

## 2022-05-27 DIAGNOSIS — M19.90 OSTEOARTHRITIS, UNSPECIFIED OSTEOARTHRITIS TYPE, UNSPECIFIED SITE: ICD-10-CM

## 2022-05-27 DIAGNOSIS — Z13.820 ENCOUNTER FOR OSTEOPOROSIS SCREENING IN ASYMPTOMATIC POSTMENOPAUSAL PATIENT: ICD-10-CM

## 2022-05-27 PROCEDURE — 77063 BREAST TOMOSYNTHESIS BI: CPT

## 2022-05-27 PROCEDURE — 77080 DXA BONE DENSITY AXIAL: CPT

## 2022-05-27 PROCEDURE — 77067 SCR MAMMO BI INCL CAD: CPT

## 2022-05-31 DIAGNOSIS — R92.2 DENSE BREAST TISSUE ON MAMMOGRAM: Primary | ICD-10-CM

## 2022-06-01 ENCOUNTER — TELEPHONE (OUTPATIENT)
Dept: OBGYN CLINIC | Facility: CLINIC | Age: 62
End: 2022-06-01

## 2022-06-01 DIAGNOSIS — Z12.31 ENCOUNTER FOR SCREENING MAMMOGRAM FOR BREAST CANCER: Primary | ICD-10-CM

## 2022-06-01 NOTE — TELEPHONE ENCOUNTER
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

## 2022-06-01 NOTE — TELEPHONE ENCOUNTER
Called the reading room, they said they did not recommend an abus and on the  2020 report and current report it does indicate she had heterogeneously dense breast tissue, they don't know where/ why you need the addendum for, and the mammograms were compared

## 2022-06-01 NOTE — TELEPHONE ENCOUNTER
I spoke to Dr Joselin Regan  She would like the radiologist to verify that they had compared this mammogram to the 1 performed in 2020  She would like an addendum placed  You can let the radiologist know this stemmed from the fact that she was recommended to have an ABUS for dense breasts which is a relatively new recommendation    When she reviewed her mammogram there was no statement that said it was compared to the previous mammogram

## 2022-06-01 NOTE — TELEPHONE ENCOUNTER
Per comm consent lm to pt with msg from Dr Rico Cruz  Reading room was called and confirmed that it was compared

## 2022-06-01 NOTE — TELEPHONE ENCOUNTER
----- Message from Alfred Webster sent at 6/1/2022  8:15 AM EDT -----  Regarding: Question regarding Mammography Sc Bilateral  Tried calling back just get sent to voicemail will review the report with Dr Aroldo Larkin when I see him

## 2022-06-03 ENCOUNTER — TELEMEDICINE (OUTPATIENT)
Dept: FAMILY MEDICINE CLINIC | Facility: CLINIC | Age: 62
End: 2022-06-03
Payer: COMMERCIAL

## 2022-06-03 DIAGNOSIS — E78.00 PURE HYPERCHOLESTEROLEMIA: ICD-10-CM

## 2022-06-03 DIAGNOSIS — U07.1 COVID-19: Primary | ICD-10-CM

## 2022-06-03 PROCEDURE — 99214 OFFICE O/P EST MOD 30 MIN: CPT | Performed by: NURSE PRACTITIONER

## 2022-06-03 PROCEDURE — 1036F TOBACCO NON-USER: CPT | Performed by: NURSE PRACTITIONER

## 2022-06-03 NOTE — PATIENT INSTRUCTIONS
Problem List Items Addressed This Visit          Other    Pure hypercholesterolemia     No current issues regarding this  COVID-19 - Primary     Paxlovid therapy was ordered for the patient to be taken as directed  Patient was also advised to begin Mucinex every 12 hours to help with chest congestion  Patient was advised to continue to quarantine through 06/05/2022  She was then advised that if she remains afebrile for 24 hours without the use of antipyretics starting on 06/05/2022 she may return from isolation on 06/06/2022 and begin to mask for 5 days when in public and when around others  Relevant Medications    nirmatrelvir & ritonavir (Paxlovid) tablet therapy pack           [address]    Your healthcare provider and/or public health staff have evaluated you and have determined that you do not need to remain in the hospital at this time  At this time you can be isolated at home where you will be monitored by staff from your local or state health department  You should carefully follow the prevention and isolation steps below until a healthcare provider or local or state health department says that you can return to your normal activities  Stay home except to get medical care    People who are mildly ill with COVID-19 are able to isolate at home during their illness  You should restrict activities outside your home, except for getting medical care  Do not go to work, school, or public areas  Avoid using public transportation, ride-sharing, or taxis  Separate yourself from other people and animals in your home    People: As much as possible, you should stay in a specific room and away from other people in your home  Also, you should use a separate bathroom, if available  Animals: You should restrict contact with pets and other animals while you are sick with COVID-19, just like you would around other people   Although there have not been reports of pets or other animals becoming sick with COVID-19, it is still recommended that people sick with COVID-19 limit contact with animals until more information is known about the virus  When possible, have another member of your household care for your animals while you are sick  If you are sick with COVID-19, avoid contact with your pet, including petting, snuggling, being kissed or licked, and sharing food  If you must care for your pet or be around animals while you are sick, wash your hands before and after you interact with pets and wear a facemask  See COVID-19 and Animals for more information  Call ahead before visiting your doctor    If you have a medical appointment, call the healthcare provider and tell them that you have or may have COVID-19  This will help the healthcare providers office take steps to keep other people from getting infected or exposed  Wear a facemask    You should wear a facemask when you are around other people (e g , sharing a room or vehicle) or pets and before you enter a healthcare providers office  If you are not able to wear a facemask (for example, because it causes trouble breathing), then people who live with you should not stay in the same room with you, or they should wear a facemask if they enter your room  Cover your coughs and sneezes    Cover your mouth and nose with a tissue when you cough or sneeze  Throw used tissues in a lined trash can  Immediately wash your hands with soap and water for at least 20 seconds or, if soap and water are not available, clean your hands with an alcohol-based hand  that contains at least 60% alcohol  Clean your hands often    Wash your hands often with soap and water for at least 20 seconds, especially after blowing your nose, coughing, or sneezing; going to the bathroom; and before eating or preparing food   If soap and water are not readily available, use an alcohol-based hand  with at least 60% alcohol, covering all surfaces of your hands and rubbing them together until they feel dry  Soap and water are the best option if hands are visibly dirty  Avoid touching your eyes, nose, and mouth with unwashed hands  Avoid sharing personal household items    You should not share dishes, drinking glasses, cups, eating utensils, towels, or bedding with other people or pets in your home  After using these items, they should be washed thoroughly with soap and water  Clean all high-touch surfaces everyday    High touch surfaces include counters, tabletops, doorknobs, bathroom fixtures, toilets, phones, keyboards, tablets, and bedside tables  Also, clean any surfaces that may have blood, stool, or body fluids on them  Use a household cleaning spray or wipe, according to the label instructions  Labels contain instructions for safe and effective use of the cleaning product including precautions you should take when applying the product, such as wearing gloves and making sure you have good ventilation during use of the product  Monitor your symptoms    Seek prompt medical attention if your illness is worsening (e g , difficulty breathing)  Before seeking care, call your healthcare provider and tell them that you have, or are being evaluated for, COVID-19  Put on a facemask before you enter the facility  These steps will help the healthcare providers office to keep other people in the office or waiting room from getting infected or exposed  Ask your healthcare provider to call the local or state health department  Persons who are placed under active monitoring or facilitated self-monitoring should follow instructions provided by their local health department or occupational health professionals, as appropriate  If you have a medical emergency and need to call 911, notify the dispatch personnel that you have, or are being evaluated for COVID-19  If possible, put on a facemask before emergency medical services arrive      Discontinuing home isolation    Patients with confirmed COVID-19 should remain under home isolation precautions until the following conditions are met: They have had no fever for at least 24 hours (that is one full day of no fever without the use medicine that reduces fevers)  AND  other symptoms have improved (for example, when their cough or shortness of breath have improved)  AND  If had mild or moderate illness, at least 10 days have passed since their symptoms first appeared or if severe illness (needed oxygen) or immunosuppressed, at least 20 days have passed since symptoms first appeared  Patients with confirmed COVID-19 should also notify close contacts (including their workplace) and ask that they self-quarantine  Currently, close contact is defined as being within 6 feet for 15 minutes or more from the period 24 hours starting 48 hours before symptom onset to the time at which the patient went into isolation  Close contacts of patients diagnosed with COVID-19 should be instructed by the patient to self-quarantine for 14 days from the last time of their last contact with the patient  Source: Mindi calles     PAXLOVID is an investigational medicine used to treat mild-to-moderate COVID-19 in adults and children [15years of age and older weighing at least 80 pounds (36 kg)] with positive results of direct SARS-CoV-2 viral testing, and who are at high risk for progression to severe COVID-19, including hospitalization or death  PAXLOVID is investigational because it is still being studied  There is limited information about the safety and effectiveness of using PAXLOVID to treat people with mild-to-moderate COVID-19      The FDA has authorized the emergency use of PAXLOVID for the treatment of mild-tomoderate COVID-19 in adults and children [15years of age and older weighing at least 80 pounds (36 kg)] with a positive test for the virus that causes COVID-19, and who are at high risk for progression to severe COVID-19, including hospitalization or death, under an EUA  What should I tell my healthcare provider before I take PAXLOVID? Tell your healthcare provider if you:  Have any allergies  Have liver or kidney disease  Are pregnant or plan to become pregnant  Are breastfeeding a child  ave any serious illnesses    Tell your healthcare provider about all the medicines you take, including prescription and over-the-counter medicines, vitamins, and herbal supplements  Some medicines may interact with PAXLOVID and may cause serious side effects  Keep a list of your medicines to show your healthcare provider and pharmacist when you get a new medicine  You can ask your healthcare provider or pharmacist for a list of medicines that interact with PAXLOVID  Do not start taking a new medicine without telling your healthcare provider  Your healthcare provider can tell you if it is safe to take PAXLOVID with other medicines  Tell your healthcare provider if you are taking combined hormonal contraceptive  PAXLOVID may affect how your birth control pills work  Females who are able to become pregnant should use another effective alternative form of contraception or an additional barrier method of contraception  Talk to your healthcare provider if you have any questions about contraceptive methods that might be right for you  How do I take PAXLOVID? PAXLOVID consists of 2 medicines: nirmatrelvir and ritonavir  Take 2 pink tablets of nirmatrelvir with 1 white tablet of ritonavir by mouth 2 times each day (in the morning and in the evening) for 5 days  For each dose, take all 3 tablets at the same time  If you have kidney disease, talk to your healthcare provider  You may need a different dose  Swallow the tablets whole  Do not chew, break, or crush the tablets  Take PAXLOVID with or without food    Do not stop taking PAXLOVID without talking to your healthcare provider, even if you feel better  If you miss a dose of PAXLOVID within 8 hours of the time it is usually taken, take it as soon as you remember  If you miss a dose by more than 8 hours, skip the missed dose and take the next dose at your regular time  Do not take 2 doses of PAXLOVID at the same time  If you take too much PAXLOVID, call your healthcare provider or go to the nearest hospital emergency room right away  If you are taking a ritonavir- or cobicistat-containing medicine to treat hepatitis C or Human Immunodeficiency Virus (HIV), you should continue to take your medicine as prescribed by your healthcare provider  Talk to your healthcare provider if you do not feel better or if you feel worse after 5 days  Who should generally not take PAXLOVID? Do not take PAXLOVID if:  You are allergic to nirmatrelvir, ritonavir, or any of the ingredients in PAXLOVID  You are taking any of the following medicines:  Alfuzosin  Pethidine, piroxicam, propoxyphene  Ranolazine  Amiodarone, dronedarone, flecainide, propafenone, quinidine  Colchicine  Lurasidone, pimozide, clozapine  Dihydroergotamine, ergotamine, methylergonovine  Lovastatin, simvastatin  Sildenafil (Revatio®) for pulmonary arterial hypertension (PAH)  Triazolam, oral midazolam  Apalutamide  Carbamazepine, phenobarbital, phenytoin  Rifampin  St  Kharis Wort (hypericum perforatum)    What are the important possible side effects of PAXLOVID? Possible side effects of PAXLOVID are:  Liver Problems  Tell your healthcare provider right away if you have any of these signs and symptoms of liver problems: loss of appetite, yellowing of your skin and the whites of eyes (jaundice), dark-colored urine, pale colored stools and itchy skin, stomach area (abdominal) pain  Resistance to HIV Medicines  If you have untreated HIV infection, PAXLOVID may lead to some HIV medicines not working as well in the future    Other possible side effects include: altered sense of taste, diarrhea, high blood pressure, or muscle aches    These are not all the possible side effects of PAXLOVID  Not many people have taken PAXLOVID  Serious and unexpected side effects may happen  Kal Daniels is still being studied, so it is possible that all of the risks are not known at this time  What other treatment choices are there? Like Elsa Lira may allow for the emergency use of other medicines to treat people with COVID-19  Go to https://Jump[employer]/ for information on the emergency use of other medicines that are authorized by FDA to treat people with COVID-19  Your healthcare provider may talk with you about clinical trials for which you may be eligible  It is your choice to be treated or not to be treated with PAXLOVID  Should you decide not to receive it or for your child not to receive it, it will not change your standard medical care  What if I am pregnant or breastfeeding? There is no experience treating pregnant women or breastfeeding mothers with PAXLOVID  For a mother and unborn baby, the benefit of taking PAXLOVID may be greater than the risk from the treatment  If you are pregnant, discuss your options and specific situation with your healthcare provider  It is recommended that you use effective barrier contraception or do not have sexual activity while taking PAXLOVID  If you are breastfeeding, discuss your options and specific situation with your healthcare provider  How do I report side effects with PAXLOVID? Contact your healthcare provider if you have any side effects that bother you or do not go away  Report side effects to FDA MedWatch at www fda gov/medwatch or call 1-549-GZM5484 or you can report side effects to [facility]  at the contact information provided below      Website Fax number Telephone number www [employer] [phone]     How should I store [address]? Store PAXLOVID tablets at room temperature between 68°F to 77°F (20°C to 25°C)    Full fact sheet for patients, parents, and caregivers can be found at: [employer]

## 2022-06-03 NOTE — PROGRESS NOTES
COVID-19 Outpatient Progress Note    Assessment/Plan:    Problem List Items Addressed This Visit        Other    Pure hypercholesterolemia     No current issues regarding this  COVID-19 - Primary     Paxlovid therapy was ordered for the patient to be taken as directed  Patient was also advised to begin Mucinex every 12 hours to help with chest congestion  Patient was advised to continue to quarantine through 06/05/2022  She was then advised that if she remains afebrile for 24 hours without the use of antipyretics starting on 06/05/2022 she may return from isolation on 06/06/2022 and begin to mask for 5 days when in public and when around others  Relevant Medications    nirmatrelvir & ritonavir (Paxlovid) tablet therapy pack         Disposition:     Patient already tested positive for COVID  I have spent 15 minutes directly with the patient  Encounter provider FABRICE Leggett    Provider located at [address] [id number] [phone]    Recent Visits  No visits were found meeting these conditions  Showing recent visits within past 7 days and meeting all other requirements  Today's Visits  Date Type Provider Dept   06/03/22 Telemedicine [facility]   Showing today's visits and meeting all other requirements  Future Appointments  No visits were found meeting these conditions  Showing future appointments within next 150 days and meeting all other requirements     This virtual check-in was done via [employer] and patient was informed that this is a secure, HIPAA-compliant platform  She agrees to proceed  Patient agrees to participate in a virtual check in via telephone or video visit instead of presenting to the office to address urgent/immediate medical needs  Patient is aware this is a billable service      After connecting through [facility], the patient was identified by name and date of birth  Alex Garcia was informed that this was a telemedicine visit and that the exam was being conducted confidentially over secure lines  My office door was closed  No one else was in the room  Alex Garcia acknowledged consent and understanding of privacy and security of the telemedicine visit  I informed the patient that I have reviewed her record in Epic and presented the opportunity for her to ask any questions regarding the visit today  The patient agreed to participate  Verification of patient location:  Patient is located in the following state in which I hold an active license: [place]    Subjective:   Alex Garcia is a 57 y o  female who is concerned about COVID-19  Patient's symptoms include nasal congestion, rhinorrhea and cough (productive )  Patient denies fever, chills, fatigue, malaise, sore throat, anosmia, loss of taste, shortness of breath, chest tightness, abdominal pain, nausea, vomiting, diarrhea, myalgias and headaches  - Date of symptom onset: 6/1/2022      COVID-19 vaccination status: Fully vaccinated with booster    Exposure:   Contact with a person who is under investigation (PUI) for or who is positive for COVID-19 within the last 14 days?: No    Hospitalized recently for fever and/or lower respiratory symptoms?: No      Currently a healthcare worker that is involved in direct patient care?: No      Works in a special setting where the risk of COVID-19 transmission may be high? (this may include long-term care, correctional and [facility] facilities; homeless shelters; assisted-living facilities and group homes ): No      Resident in a special setting where the risk of COVID-19 transmission may be high? (this may include long-term care, correctional and [facility] facilities; homeless shelters; assisted-living facilities and group homes ): No      Patient tested positive for [place] via PCR yesterday    The patient is currently reporting symptoms of intermittent productive cough, rhinorrhea, and nasal congestion  The patient denies any fevers or shortness of breath  Patient is fully vaccinated against COVID and does have her booster dose as well  After having a discussion with the patient about the benefits and side effects of Paxlovid therapy the patient was agreeable to taking the medication so this was prescribed for her  Hypercholesterolemia:  Patient's most recent lipid panel was completed in January 2021 and was normal at that time  Patient is currently not taking cholesterol medication  Lab Results   Component Value Date    SARSCOV2 Not Detected 03/23/2020    SARSCORONAVI Detected (A) 06/02/2022     Past Medical History:   Diagnosis Date    Anxiety     Depression     Migraine     Osteoporosis      Past Surgical History:   Procedure Laterality Date    BREAST BIOPSY Right 2007    core bx benign    BREAST BIOPSY Left 2009    core bx benign    COLONOSCOPY      WISDOM TOOTH EXTRACTION      in her 20's     Current Outpatient Medications   Medication Sig Dispense Refill    nirmatrelvir & ritonavir (Paxlovid) tablet therapy pack Take 3 tablets by mouth 2 (two) times a day for 5 days Take 2 nirmatrelvir tablets + 1 ritonavir tablet together per dose 30 tablet 0    azelastine (OPTIVAR) 0 05 % ophthalmic solution       estradiol (Vagifem) 10 MCG TABS vaginal tablet Insert 1 tablet (10 mcg total) into the vagina once a week 13 tablet 3    Multiple Vitamin (MULTIVITAMINS PO) Take by mouth      VITAMIN D, CHOLECALCIFEROL, PO Take by mouth       No current facility-administered medications for this visit  Allergies   Allergen Reactions    Sulfa Antibiotics Nausea Only    Sulfamethoxazole-Trimethoprim        Review of Systems   Constitutional: Negative for chills, fatigue and fever  HENT: Positive for congestion and rhinorrhea  Negative for sore throat  Eyes: Negative  Respiratory: Positive for cough (productive )   Negative for chest tightness and shortness of breath  Cardiovascular: Negative  Gastrointestinal: Negative for abdominal pain, diarrhea, nausea and vomiting  Endocrine: Negative  Genitourinary: Negative  Musculoskeletal: Negative for myalgias  Skin: Negative  Allergic/Immunologic: Negative  Neurological: Negative for headaches  Hematological: Negative  Psychiatric/Behavioral: Negative  Objective: There were no vitals filed for this visit  Physical Exam  Vitals reviewed: limited due to [employer] exam    Constitutional:       General: She is not in acute distress  Appearance: Normal appearance  She is not ill-appearing  Neurological:      Mental Status: She is alert  VIRTUAL VISIT [id number] Noé verbally agrees to participate in [employer]  Pt is aware that [employer] could be limited without vital signs or the ability to perform a full hands-on physical Yelena Kemp understands she or the provider may request at any time to terminate the video visit and request the patient to seek care or treatment in person

## 2022-06-03 NOTE — LETTER
Nanette 3, 2022     Patient: Sita Landry  YOB: 1960  Date of Visit: 6/3/2022      To Whom it May Concern:    Booker Mosquera is under my professional care  Koffi Awad was seen in my office on 6/3/2022  Koffi Awad tested positive for COVID on 06/02/2022 and will need to quarantine through 06/05/2022  Patient may return to work on 06/06/2022 and begin to strictly mask for 5 days when in public and when around others  If you have any questions or concerns, please don't hesitate to call           Sincerely,          FABRICE Warner        CC: No Recipients

## 2022-06-03 NOTE — ASSESSMENT & PLAN NOTE
Paxlovid therapy was ordered for the patient to be taken as directed  Patient was also advised to begin Mucinex every 12 hours to help with chest congestion  Patient was advised to continue to quarantine through 06/05/2022  She was then advised that if she remains afebrile for 24 hours without the use of antipyretics starting on 06/05/2022 she may return from isolation on 06/06/2022 and begin to mask for 5 days when in public and when around others

## 2022-07-05 ENCOUNTER — RA CDI HCC (OUTPATIENT)
Dept: OTHER | Facility: HOSPITAL | Age: 62
End: 2022-07-05

## 2022-07-05 NOTE — PROGRESS NOTES
[facility]       Chart reviewed, no opportunity found: CHART REVIEWED, NO OPPORTUNITY FOUND        Patients Insurance        Commercial Insurance: [address] [place] [address]

## 2022-07-09 PROBLEM — U07.1 COVID-19: Status: RESOLVED | Noted: 2022-06-03 | Resolved: 2022-07-09

## 2022-07-09 PROBLEM — D36.9 ADENOMATOUS POLYP: Status: ACTIVE | Noted: 2022-07-09

## 2022-07-09 PROBLEM — E78.00 PURE HYPERCHOLESTEROLEMIA: Status: RESOLVED | Noted: 2021-07-05 | Resolved: 2022-07-09

## 2022-07-09 PROBLEM — Z12.31 ENCOUNTER FOR SCREENING MAMMOGRAM FOR BREAST CANCER: Status: RESOLVED | Noted: 2021-07-17 | Resolved: 2022-07-09

## 2022-07-09 PROBLEM — Z01.419 ENCOUNTER FOR ANNUAL ROUTINE GYNECOLOGICAL EXAMINATION: Status: RESOLVED | Noted: 2021-07-17 | Resolved: 2022-07-09

## 2022-07-12 ENCOUNTER — OFFICE VISIT (OUTPATIENT)
Dept: FAMILY MEDICINE CLINIC | Facility: CLINIC | Age: 62
End: 2022-07-12
Payer: COMMERCIAL

## 2022-07-12 VITALS
WEIGHT: 100 LBS | BODY MASS INDEX: 17.72 KG/M2 | HEART RATE: 72 BPM | HEIGHT: 63 IN | DIASTOLIC BLOOD PRESSURE: 60 MMHG | SYSTOLIC BLOOD PRESSURE: 88 MMHG

## 2022-07-12 DIAGNOSIS — D36.9 ADENOMATOUS POLYP: ICD-10-CM

## 2022-07-12 DIAGNOSIS — E55.9 VITAMIN D DEFICIENCY: ICD-10-CM

## 2022-07-12 DIAGNOSIS — Z00.00 PE (PHYSICAL EXAM), ANNUAL: Primary | ICD-10-CM

## 2022-07-12 DIAGNOSIS — H04.123 DRY EYE SYNDROME OF BOTH EYES: ICD-10-CM

## 2022-07-12 DIAGNOSIS — N95.2 ATROPHIC VAGINITIS: ICD-10-CM

## 2022-07-12 DIAGNOSIS — D69.6 THROMBOCYTOPENIA (HCC): ICD-10-CM

## 2022-07-12 DIAGNOSIS — J30.89 SEASONAL ALLERGIC RHINITIS DUE TO OTHER ALLERGIC TRIGGER: ICD-10-CM

## 2022-07-12 PROCEDURE — 99396 PREV VISIT EST AGE 40-64: CPT | Performed by: FAMILY MEDICINE

## 2022-07-12 PROCEDURE — 3725F SCREEN DEPRESSION PERFORMED: CPT | Performed by: FAMILY MEDICINE

## 2022-07-12 RX ORDER — CYCLOSPORINE 0.5 MG/ML
1 EMULSION OPHTHALMIC DAILY PRN
COMMUNITY

## 2022-07-12 NOTE — PROGRESS NOTES
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 Annual PE and annual bloodwork  Has GYN, Dr Kari Newton  Rodrick is UTD  Colonoscopy UTD  1  Annual physical exam - well 57 year-old female  In excellent health  Labs ordered; call with results  2  Thrombocytopenia - recheck CBC    3  Vitamin-D deficiency - check vitamin-D level  She felt that her Vit D level was too high and therefore does not take additional OTC supplements at present  4  Seasonal allergic rhinitis - stable at present    5  Atrophic vaginitis - vagifem    6  Dry eye syndrome - restasis    7  Seeing Dr Bhargav Weston for osteoporosis, Dexa screening  8  Routine GYN  Surveillance - Dr Honey Soto    9  Immun reviewed - she had Pneumovax at age 56  Would do prevnar 21 at age 70  Labs ordered  Call with results  F/U 1 year or prn  Subjective:      Patient ID: Mira Moralez is a 57 y o  female  CC:    Chief Complaint   Patient presents with    Follow-up     Pt states she is here for an annual check up  mjs       HPI:    68-year-old female presents today for annual physical exam   She feels well with no acute complaints  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[facility] [employer]  The following portions of the patient's history were reviewed and updated as appropriate: allergies, current medications, past family history, past medical history, past social history, past surgical history and problem list       Review of Systems   Constitutional:        See HPI   HENT: Negative for congestion, ear pain, mouth sores, sinus pressure and trouble swallowing  Eyes: Negative for discharge, redness and itching  Respiratory: Negative for apnea, cough, chest tightness, shortness of breath, wheezing and stridor  Cardiovascular: Negative for chest pain, palpitations and leg swelling     Gastrointestinal: Negative for abdominal distention, abdominal pain, blood in stool, constipation, diarrhea, nausea and vomiting  Endocrine: Negative for cold intolerance and heat intolerance  Genitourinary: Negative for difficulty urinating, dysuria, flank pain and urgency  Musculoskeletal: Negative for arthralgias and myalgias  Skin: Negative for rash  Neurological: Negative for dizziness, seizures, syncope, speech difficulty, weakness, light-headedness, numbness and headaches  Hematological: Negative for adenopathy  Psychiatric/Behavioral: Negative for agitation, behavioral problems, confusion and sleep disturbance  The patient is not nervous/anxious  Data to review:       Objective:    Vitals:    07/12/22 0758   BP: (!) 88/60   Pulse: 72   Weight: 45 4 kg (100 lb)   Height: 5' 3" (1 6 m)        Physical Exam  Vitals and nursing note reviewed  Constitutional:       General: She is not in acute distress  Appearance: She is well-developed  She is not ill-appearing, toxic-appearing or diaphoretic  Eyes:      General: No scleral icterus  Conjunctiva/sclera: Conjunctivae normal       Pupils: Pupils are equal, round, and reactive to light  Neck:      Vascular: No carotid bruit  Cardiovascular:      Rate and Rhythm: Normal rate and regular rhythm  Heart sounds: Normal heart sounds  No murmur heard  No friction rub  No gallop  Pulmonary:      Effort: Pulmonary effort is normal  No respiratory distress  Breath sounds: Normal breath sounds  No wheezing or rales  Abdominal:      General: Abdomen is flat  Bowel sounds are normal  There is no distension  Palpations: Abdomen is soft  Tenderness: There is no abdominal tenderness  There is no right CVA tenderness or guarding  Musculoskeletal:         General: Normal range of motion  Cervical back: Normal range of motion and neck supple  Right lower leg: No edema  Left lower leg: No edema  Lymphadenopathy:      Cervical: No cervical adenopathy     Skin: General: Skin is warm and dry  Coloration: Skin is not jaundiced  Neurological:      General: No focal deficit present  Mental Status: She is alert and oriented to person, place, and time  Gait: Gait normal    Psychiatric:         Mood and Affect: Mood normal          Behavior: Behavior normal          Thought Content: Thought content normal          Judgment: Judgment normal            BMI Counseling: Body mass index is 17 71 kg/m²  The BMI is below normal  Dietary education for weight gain was provided  Patient referred to PCP  Rationale for BMI follow-up plan is due to patient being underweight  Pt's weight is stable for her and has been for 30 years  She eats normally  She has no history of eating disorder  No further intervention required  Depression Screening and Follow-up Plan: Patient was screened for depression during today's encounter  They screened negative with a PHQ-2 score of 0

## 2022-07-23 PROBLEM — Z12.4 SCREENING FOR CERVICAL CANCER: Status: ACTIVE | Noted: 2022-07-23

## 2022-07-23 PROBLEM — R87.610 ASCUS OF CERVIX WITH NEGATIVE HIGH RISK HPV: Status: ACTIVE | Noted: 2022-07-23

## 2022-07-23 PROBLEM — Z01.419 ENCOUNTER FOR ANNUAL ROUTINE GYNECOLOGICAL EXAMINATION: Status: ACTIVE | Noted: 2022-07-23

## 2022-07-23 PROBLEM — R92.30 DENSE BREAST TISSUE ON MAMMOGRAM: Status: ACTIVE | Noted: 2022-07-23

## 2022-07-23 PROBLEM — Z79.818 CURRENT USE OF ESTROGEN THERAPY: Status: ACTIVE | Noted: 2022-07-23

## 2022-07-23 PROBLEM — R92.2 DENSE BREAST TISSUE ON MAMMOGRAM: Status: ACTIVE | Noted: 2022-07-23

## 2022-07-23 PROBLEM — Z12.31 ENCOUNTER FOR SCREENING MAMMOGRAM FOR BREAST CANCER: Status: ACTIVE | Noted: 2022-07-23

## 2022-07-23 PROBLEM — Z79.899 CURRENT USE OF ESTROGEN THERAPY: Status: ACTIVE | Noted: 2022-07-23

## 2022-07-24 NOTE — PROGRESS NOTES
Assessment/Plan:  Normal gynecological exam  CoTesting '20- ASCUS/HPV neg; repeat Pap alone '21- ASCUS again - repeat today  Osteoporosis- observed, Rx would be declined; poorly tolerated Boniva in 2012- declines another DEXA scan  Vaginal Dryness- Vagifem 1/wk  May go back to twice a week if this Pap smear returns ASCUS  Most likely will decline a colposcopy and repeat the Pap smear in 6 month after using Vagifem 2/wk  Return to office in 1 year  Annual 3-D mammography  Dense Breasts - ABUS explained, ordered  Had been going for Mammo q 2yrs  Exercise 3 days a week- she does 5  Calcium 1200 mg daily with vitamin D  Colonoscopy - 2021- planned for later in the year      Depression screen: Neg        Diagnoses and all orders for this visit:    Encounter for annual routine gynecological examination  -     Liquid-based pap, screening    Screening for cervical cancer  -     Liquid-based pap, screening    ASCUS of cervix with negative high risk HPV  -     Liquid-based pap, screening    Encounter for screening mammogram for breast cancer  -     Mammo screening bilateral w 3d & cad; Future    Dense breast tissue on mammogram    Atrophic vaginitis  -     estradiol (Vagifem) 10 MCG TABS vaginal tablet; Insert 1 tablet (10 mcg total) into the vagina once a week    Current use of estrogen therapy    - active order for ABUS          Subjective:        Patient ID: Inga Ambrosio is a 57 y o  female  Lucila Sidhu returns for a yearly evaluation  She has no complaints  She remains sexually active  She denies any vaginal bleeding  She is using Vagifem 1 tablet a week  She tried to use a half a tablet a week in the past but the pills could not be cut in half  Since she has gone down to once a week her cervical cytology has returned ASCUS  If this current Pap smear returns ASCUS she would probably declined colposcopy  If that is the case she will go back to using Vagifem twice a week and re-screening the cervix in 6 months  We discussed ABUS  She usually goes for a mammogram every 2 years  She has the ABUS scheduled for November  She is considering just combining the 2 procedures for next May  The following portions of the patient's history were reviewed and updated as appropriate: She  has a past medical history of Anxiety, Depression, Migraine, and Osteoporosis  Patient Active Problem List    Diagnosis Date Noted    Encounter for annual routine gynecological examination 07/23/2022    Screening for cervical cancer 07/23/2022    ASCUS of cervix with negative high risk HPV 07/23/2022    Encounter for screening mammogram for breast cancer 07/23/2022    Dense breast tissue on mammogram 07/23/2022    Current use of estrogen therapy 07/23/2022    Adenomatous polyp 07/09/2022    Vitamin D deficiency 07/05/2021    Thrombocytopenia ([address] ) 07/05/2021    Allergic rhinitis 02/05/2019    Dry eye syndrome of both eyes 02/05/2019    Atrophic vaginitis 02/05/2019   PMH:  G3, P2; SAVD x 2, '88, 12/90, Spont Ab '89  Vulvitis 1/10- Lidex cream  R Breast Bx '99  L Stereotactic Bx- 1/09  Dry Eyes '09  Lyme's Disease 8/12  Osteoporosis- Boniva for 1 yr, d/c'd due to SE's- flu-like sx's '11  Declines BMD- would not treat  Dyspareunia/Vaginal Dryness- '14 Vagifem SE's '15, trial of Estrace '16, back to Vagifem 1/wk       Reactive depression 12/17- FDC/'s illness       Pneumonia RLL 1/19      Bronchitis 2/20      Pneumonia 3/20      Covid 4/21, 6/22  She  has a past surgical history that includes [place] tooth extraction; Breast biopsy (Right, 2007); Breast biopsy (Left, 2009); and Colonoscopy    Her family history includes ALS in her mother; Colon cancer in her paternal grandmother; Diabetes in her paternal grandmother; Heart attack in her paternal grandfather; Hyperlipidemia in her mother; Hypertension in her father; No Known Problems in her daughter, daughter, maternal aunt, maternal aunt, maternal grandmother, sister, and sister; Stroke in her maternal grandfather  FH:  Mother  age 66 from [address], she also had hypercholesterolemia  Father had hypercholesterolemia   MGF- CVA  PGF- HD  PGM- Colon Ca  MA- d 89 Parkinson's and dementia '20  Several females have Osteoporosis- no Fx's  She  reports that she has never smoked  She has never used smokeless tobacco  She reports current alcohol use  She reports that she does not use drugs  SH:  Internist   Luh Diamond (who I delivered) is , teaching in [place] is the debate , and has 2 children  Monica Mejias live in [place] 40 minutes apart   Jose R survived cancer and has adrenal insufficiency and associated depression   long term plans changed  Gabbi Hill returned to [employer] working 3 days a week  Now it's 5d/wk    Jose R has adjusted to senior living  The visited the girls this past spring during the war  Current Outpatient Medications   Medication Sig Dispense Refill    azelastine (OPTIVAR) 0 05 % ophthalmic solution       Calcium-Magnesium-Vitamin D (CALCIUM 1200+D3 PO) Take by mouth      cycloSPORINE (RESTASIS) 0 05 % ophthalmic emulsion 1 drop 2 (two) times a day      estradiol (Vagifem) 10 MCG TABS vaginal tablet Insert 1 tablet (10 mcg total) into the vagina once a week 13 tablet 3    Multiple Vitamin (MULTIVITAMINS PO) Take by mouth       No current facility-administered medications for this visit       Current Outpatient Medications on File Prior to Visit   Medication Sig    azelastine (OPTIVAR) 0 05 % ophthalmic solution     Calcium-Magnesium-Vitamin D (CALCIUM 1200+D3 PO) Take by mouth    cycloSPORINE (RESTASIS) 0 05 % ophthalmic emulsion 1 drop 2 (two) times a day    Multiple Vitamin (MULTIVITAMINS PO) Take by mouth    [DISCONTINUED] estradiol (Vagifem) 10 MCG TABS vaginal tablet Insert 1 tablet (10 mcg total) into the vagina once a week     No current facility-administered medications on file prior to visit      She is allergic to sulfa antibiotics and sulfamethoxazole-trimethoprim       Review of Systems   Constitutional: Negative for activity change, appetite change, fatigue and unexpected weight change  Eyes: Negative for visual disturbance  Respiratory: Negative for cough, chest tightness, shortness of breath and wheezing  Cardiovascular: Negative for chest pain, palpitations and leg swelling  Breast: Patient denies tenderness, nipple discharge, masses, or erythema  Gastrointestinal: Negative for abdominal distention, abdominal pain, blood in stool, constipation, diarrhea, nausea and vomiting  Endocrine: Negative for cold intolerance and heat intolerance  Genitourinary: Negative for decreased urine volume, difficulty urinating, dyspareunia, dysuria, frequency, hematuria, menstrual problem, pelvic pain, urgency, vaginal bleeding, vaginal discharge and vaginal pain  [place] once a week  They use a lubricant  Stress incontinence if her bladder is full  This is rare  Musculoskeletal: Negative for arthralgias  Skin: Negative for rash  Neurological: Negative for weakness, light-headedness, numbness and headaches  Hematological: Does not bruise/bleed easily  Psychiatric/Behavioral: Negative for agitation, behavioral problems and sleep disturbance  The patient is not nervous/anxious  Objective:    Vitals:    07/25/22 1319   BP: 96/62   BP Location: Right arm   Patient Position: Sitting   Cuff Size: Standard   Weight: 45 4 kg (100 lb)   Height: 5' 2 5" (1 588 m)            Physical Exam  Vitals and nursing note reviewed  Constitutional:       General: She is not in acute distress  Appearance: She is well-developed  HENT:      Head: Normocephalic and atraumatic  Eyes:      General: No scleral icterus  Right eye: No discharge  Left eye: No discharge  Extraocular Movements: Extraocular movements intact        Conjunctiva/sclera: Conjunctivae normal    Neck:      Thyroid: No thyromegaly  Trachea: No tracheal deviation  Cardiovascular:      Rate and Rhythm: Normal rate and regular rhythm  Heart sounds: Normal heart sounds  No murmur heard  Pulmonary:      Effort: Pulmonary effort is normal  No respiratory distress  Breath sounds: Normal breath sounds  No wheezing  Chest:   Breasts: Breasts are symmetrical       Right: No inverted nipple, mass, nipple discharge, skin change or tenderness  Left: No inverted nipple, mass, nipple discharge, skin change or tenderness  Abdominal:      General: Abdomen is flat  Bowel sounds are normal  There is no distension  Palpations: Abdomen is soft  There is no mass  Tenderness: There is no abdominal tenderness  There is no guarding or rebound  Genitourinary:     General: Normal vulva  Labia:         Right: No rash, tenderness or lesion  Left: No rash, tenderness or lesion  Vagina: Normal       Cervix: No cervical motion tenderness or discharge  Uterus: Not deviated, not enlarged and not tender  Adnexa:         Right: No mass, tenderness or fullness  Left: No mass, tenderness or fullness  Rectum: No external hemorrhoid  Comments: Urethral meatus within normal limits  Perineum within normal limits  Bladder well supported  Physiologic vaginal atrophy  Mild contact bleeding of the posterior cervix from the Pap smear  Musculoskeletal:         General: No tenderness  Normal range of motion  Cervical back: Normal range of motion and neck supple  Lymphadenopathy:      Cervical: No cervical adenopathy  Skin:     General: Skin is warm and dry  Neurological:      Mental Status: She is alert and oriented to person, place, and time  Psychiatric:         Mood and Affect: Mood normal          Behavior: Behavior normal          Thought Content:  Thought content normal          Judgment: Judgment normal

## 2022-07-25 ENCOUNTER — ANNUAL EXAM (OUTPATIENT)
Dept: OBGYN CLINIC | Facility: CLINIC | Age: 62
End: 2022-07-25
Payer: COMMERCIAL

## 2022-07-25 VITALS
BODY MASS INDEX: 17.72 KG/M2 | SYSTOLIC BLOOD PRESSURE: 96 MMHG | HEIGHT: 63 IN | DIASTOLIC BLOOD PRESSURE: 62 MMHG | WEIGHT: 100 LBS

## 2022-07-25 DIAGNOSIS — Z01.419 ENCOUNTER FOR ANNUAL ROUTINE GYNECOLOGICAL EXAMINATION: Primary | ICD-10-CM

## 2022-07-25 DIAGNOSIS — Z12.31 ENCOUNTER FOR SCREENING MAMMOGRAM FOR BREAST CANCER: ICD-10-CM

## 2022-07-25 DIAGNOSIS — Z79.899 CURRENT USE OF ESTROGEN THERAPY: ICD-10-CM

## 2022-07-25 DIAGNOSIS — N95.2 ATROPHIC VAGINITIS: ICD-10-CM

## 2022-07-25 DIAGNOSIS — R87.610 ASCUS OF CERVIX WITH NEGATIVE HIGH RISK HPV: ICD-10-CM

## 2022-07-25 DIAGNOSIS — Z12.4 SCREENING FOR CERVICAL CANCER: ICD-10-CM

## 2022-07-25 DIAGNOSIS — R92.2 DENSE BREAST TISSUE ON MAMMOGRAM: ICD-10-CM

## 2022-07-25 PROCEDURE — G0145 SCR C/V CYTO,THINLAYER,RESCR: HCPCS | Performed by: OBSTETRICS & GYNECOLOGY

## 2022-07-25 PROCEDURE — G0476 HPV COMBO ASSAY CA SCREEN: HCPCS | Performed by: OBSTETRICS & GYNECOLOGY

## 2022-07-25 PROCEDURE — S0612 ANNUAL GYNECOLOGICAL EXAMINA: HCPCS | Performed by: OBSTETRICS & GYNECOLOGY

## 2022-07-25 RX ORDER — ESTRADIOL 10 UG/1
1 INSERT VAGINAL WEEKLY
Qty: 13 TABLET | Refills: 3 | Status: SHIPPED | OUTPATIENT
Start: 2022-07-25

## 2022-08-01 PROBLEM — M81.0 SENILE OSTEOPOROSIS: Status: ACTIVE | Noted: 2022-08-01

## 2022-08-01 LAB
LAB AP GYN PRIMARY INTERPRETATION: NORMAL
Lab: NORMAL

## 2022-08-03 ENCOUNTER — APPOINTMENT (OUTPATIENT)
Dept: LAB | Facility: HOSPITAL | Age: 62
End: 2022-08-03
Payer: COMMERCIAL

## 2022-08-03 DIAGNOSIS — H04.123 DRY EYE SYNDROME OF BOTH EYES: ICD-10-CM

## 2022-08-03 DIAGNOSIS — E55.9 VITAMIN D DEFICIENCY: ICD-10-CM

## 2022-08-03 DIAGNOSIS — J30.89 SEASONAL ALLERGIC RHINITIS DUE TO OTHER ALLERGIC TRIGGER: ICD-10-CM

## 2022-08-03 DIAGNOSIS — D69.6 THROMBOCYTOPENIA (HCC): ICD-10-CM

## 2022-08-03 DIAGNOSIS — M81.0 SENILE OSTEOPOROSIS: ICD-10-CM

## 2022-08-03 DIAGNOSIS — N95.2 ATROPHIC VAGINITIS: ICD-10-CM

## 2022-08-03 LAB
25(OH)D3 SERPL-MCNC: 39.4 NG/ML (ref 30–100)
ALBUMIN SERPL BCP-MCNC: 3.9 G/DL (ref 3.5–5)
ALP SERPL-CCNC: 78 U/L (ref 46–116)
ALT SERPL W P-5'-P-CCNC: 28 U/L (ref 12–78)
ANION GAP SERPL CALCULATED.3IONS-SCNC: 8 MMOL/L (ref 4–13)
AST SERPL W P-5'-P-CCNC: 26 U/L (ref 5–45)
BASOPHILS # BLD AUTO: 0.03 THOUSANDS/ΜL (ref 0–0.1)
BASOPHILS NFR BLD AUTO: 1 % (ref 0–1)
BILIRUB SERPL-MCNC: 0.38 MG/DL (ref 0.2–1)
BUN SERPL-MCNC: 19 MG/DL (ref 5–25)
CALCIUM SERPL-MCNC: 8.9 MG/DL (ref 8.3–10.1)
CHLORIDE SERPL-SCNC: 100 MMOL/L (ref 96–108)
CHOLEST SERPL-MCNC: 199 MG/DL
CO2 SERPL-SCNC: 28 MMOL/L (ref 21–32)
CREAT SERPL-MCNC: 0.88 MG/DL (ref 0.6–1.3)
CRP SERPL QL: <3 MG/L
EOSINOPHIL # BLD AUTO: 0.03 THOUSAND/ΜL (ref 0–0.61)
EOSINOPHIL NFR BLD AUTO: 1 % (ref 0–6)
ERYTHROCYTE [DISTWIDTH] IN BLOOD BY AUTOMATED COUNT: 13.2 % (ref 11.6–15.1)
GFR SERPL CREATININE-BSD FRML MDRD: 69 ML/MIN/1.73SQ M
GLUCOSE P FAST SERPL-MCNC: 93 MG/DL (ref 65–99)
HCT VFR BLD AUTO: 38.3 % (ref 34.8–46.1)
HDLC SERPL-MCNC: 84 MG/DL
HGB BLD-MCNC: 12.1 G/DL (ref 11.5–15.4)
IGA SERPL-MCNC: 85 MG/DL (ref 70–400)
IGG SERPL-MCNC: 628 MG/DL (ref 700–1600)
IGM SERPL-MCNC: 322 MG/DL (ref 40–230)
IMM GRANULOCYTES # BLD AUTO: 0.02 THOUSAND/UL (ref 0–0.2)
IMM GRANULOCYTES NFR BLD AUTO: 0 % (ref 0–2)
LDLC SERPL CALC-MCNC: 106 MG/DL (ref 0–100)
LYMPHOCYTES # BLD AUTO: 0.98 THOUSANDS/ΜL (ref 0.6–4.47)
LYMPHOCYTES NFR BLD AUTO: 20 % (ref 14–44)
MCH RBC QN AUTO: 30.3 PG (ref 26.8–34.3)
MCHC RBC AUTO-ENTMCNC: 31.6 G/DL (ref 31.4–37.4)
MCV RBC AUTO: 96 FL (ref 82–98)
MONOCYTES # BLD AUTO: 0.47 THOUSAND/ΜL (ref 0.17–1.22)
MONOCYTES NFR BLD AUTO: 10 % (ref 4–12)
NEUTROPHILS # BLD AUTO: 3.39 THOUSANDS/ΜL (ref 1.85–7.62)
NEUTS SEG NFR BLD AUTO: 68 % (ref 43–75)
NONHDLC SERPL-MCNC: 115 MG/DL
NRBC BLD AUTO-RTO: 0 /100 WBCS
PLATELET # BLD AUTO: 146 THOUSANDS/UL (ref 149–390)
PMV BLD AUTO: 12.5 FL (ref 8.9–12.7)
POTASSIUM SERPL-SCNC: 4 MMOL/L (ref 3.5–5.3)
PROT SERPL-MCNC: 7.2 G/DL (ref 6.4–8.4)
PTH-INTACT SERPL-MCNC: 73 PG/ML (ref 18.4–80.1)
RBC # BLD AUTO: 4 MILLION/UL (ref 3.81–5.12)
SODIUM SERPL-SCNC: 136 MMOL/L (ref 135–147)
T4 SERPL-MCNC: 7.7 UG/DL (ref 4.7–13.3)
TRIGL SERPL-MCNC: 46 MG/DL
TSH SERPL DL<=0.05 MIU/L-ACNC: 2.38 UIU/ML (ref 0.45–4.5)
WBC # BLD AUTO: 4.92 THOUSAND/UL (ref 4.31–10.16)

## 2022-08-03 PROCEDURE — 84436 ASSAY OF TOTAL THYROXINE: CPT

## 2022-08-03 PROCEDURE — 82306 VITAMIN D 25 HYDROXY: CPT

## 2022-08-03 PROCEDURE — 86258 DGP ANTIBODY EACH IG CLASS: CPT

## 2022-08-03 PROCEDURE — 82784 ASSAY IGA/IGD/IGG/IGM EACH: CPT

## 2022-08-03 PROCEDURE — 36415 COLL VENOUS BLD VENIPUNCTURE: CPT

## 2022-08-03 PROCEDURE — 86231 EMA EACH IG CLASS: CPT

## 2022-08-03 PROCEDURE — 84165 PROTEIN E-PHORESIS SERUM: CPT

## 2022-08-03 PROCEDURE — 80053 COMPREHEN METABOLIC PANEL: CPT

## 2022-08-03 PROCEDURE — 86038 ANTINUCLEAR ANTIBODIES: CPT

## 2022-08-03 PROCEDURE — 84443 ASSAY THYROID STIM HORMONE: CPT

## 2022-08-03 PROCEDURE — 80061 LIPID PANEL: CPT

## 2022-08-03 PROCEDURE — 86364 TISS TRNSGLTMNASE EA IG CLAS: CPT

## 2022-08-03 PROCEDURE — 86140 C-REACTIVE PROTEIN: CPT

## 2022-08-03 PROCEDURE — 84165 PROTEIN E-PHORESIS SERUM: CPT | Performed by: PATHOLOGY

## 2022-08-03 PROCEDURE — 84080 ASSAY ALKALINE PHOSPHATASES: CPT

## 2022-08-03 PROCEDURE — 83970 ASSAY OF PARATHORMONE: CPT

## 2022-08-03 PROCEDURE — 85025 COMPLETE CBC W/AUTO DIFF WBC: CPT

## 2022-08-03 PROCEDURE — 82523 COLLAGEN CROSSLINKS: CPT

## 2022-08-04 LAB
ENDOMYSIUM IGA SER QL: NEGATIVE
GLIADIN PEPTIDE IGA SER-ACNC: 3 UNITS (ref 0–19)
GLIADIN PEPTIDE IGG SER-ACNC: 1 UNITS (ref 0–19)
IGA SERPL-MCNC: 82 MG/DL (ref 87–352)
RYE IGE QN: NEGATIVE
TTG IGA SER-ACNC: <2 U/ML (ref 0–3)
TTG IGG SER-ACNC: <2 U/ML (ref 0–5)

## 2022-08-05 LAB
ALBUMIN SERPL ELPH-MCNC: 4.52 G/DL (ref 3.5–5)
ALBUMIN SERPL ELPH-MCNC: 67.5 % (ref 52–65)
ALPHA1 GLOB SERPL ELPH-MCNC: 0.25 G/DL (ref 0.1–0.4)
ALPHA1 GLOB SERPL ELPH-MCNC: 3.8 % (ref 2.5–5)
ALPHA2 GLOB SERPL ELPH-MCNC: 0.59 G/DL (ref 0.4–1.2)
ALPHA2 GLOB SERPL ELPH-MCNC: 8.8 % (ref 7–13)
BETA GLOB ABNORMAL SERPL ELPH-MCNC: 0.32 G/DL (ref 0.4–0.8)
BETA1 GLOB SERPL ELPH-MCNC: 4.8 % (ref 5–13)
BETA2 GLOB SERPL ELPH-MCNC: 3.7 % (ref 2–8)
BETA2+GAMMA GLOB SERPL ELPH-MCNC: 0.25 G/DL (ref 0.2–0.5)
GAMMA GLOB ABNORMAL SERPL ELPH-MCNC: 0.76 G/DL (ref 0.5–1.6)
GAMMA GLOB SERPL ELPH-MCNC: 11.4 % (ref 12–22)
IGG/ALB SER: 2.08 {RATIO} (ref 1.1–1.8)
PROT PATTERN SERPL ELPH-IMP: ABNORMAL
PROT SERPL-MCNC: 6.7 G/DL (ref 6.4–8.2)

## 2022-08-06 LAB — ALP BONE SERPL-MCNC: 13.2 UG/L

## 2022-08-07 LAB — COLLAGEN CTX SERPL-MCNC: 266 PG/ML

## 2022-08-08 ENCOUNTER — APPOINTMENT (OUTPATIENT)
Dept: LAB | Facility: HOSPITAL | Age: 62
End: 2022-08-08
Payer: COMMERCIAL

## 2022-08-08 DIAGNOSIS — M81.0 SENILE OSTEOPOROSIS: ICD-10-CM

## 2022-08-08 LAB
CALCIUM 24H UR-MCNC: <75 MG/24 HRS (ref 42–353)
SPECIMEN VOL UR: 1500 ML

## 2022-08-08 PROCEDURE — 82340 ASSAY OF CALCIUM IN URINE: CPT

## 2022-10-11 PROBLEM — Z12.4 SCREENING FOR CERVICAL CANCER: Status: RESOLVED | Noted: 2022-07-23 | Resolved: 2022-10-11

## 2023-06-26 ENCOUNTER — HOSPITAL ENCOUNTER (OUTPATIENT)
Dept: ULTRASOUND IMAGING | Facility: CLINIC | Age: 63
Discharge: HOME | End: 2023-06-26
Payer: COMMERCIAL

## 2023-06-26 ENCOUNTER — HOSPITAL ENCOUNTER (OUTPATIENT)
Dept: MAMMOGRAPHY | Facility: CLINIC | Age: 63
Discharge: HOME | End: 2023-06-26
Payer: COMMERCIAL

## 2023-06-26 VITALS — WEIGHT: 99 LBS | HEIGHT: 63 IN | BODY MASS INDEX: 17.54 KG/M2

## 2023-06-26 DIAGNOSIS — Z12.31 ENCOUNTER FOR SCREENING MAMMOGRAM FOR BREAST CANCER: ICD-10-CM

## 2023-06-26 DIAGNOSIS — R92.2 DENSE BREAST TISSUE ON MAMMOGRAM: ICD-10-CM

## 2023-06-26 PROCEDURE — 76641 ULTRASOUND BREAST COMPLETE: CPT

## 2023-06-26 PROCEDURE — 77063 BREAST TOMOSYNTHESIS BI: CPT

## 2023-06-26 PROCEDURE — 77067 SCR MAMMO BI INCL CAD: CPT

## 2023-06-28 DIAGNOSIS — R92.8 ABNORMALITY OF LEFT BREAST ON SCREENING MAMMOGRAM: Primary | ICD-10-CM

## 2023-07-03 ENCOUNTER — TELEPHONE (OUTPATIENT)
Dept: OBGYN CLINIC | Facility: CLINIC | Age: 63
End: 2023-07-03

## 2023-07-03 NOTE — TELEPHONE ENCOUNTER
----- Message from Jones Champ sent at 7/3/2023  8:08 AM EDT -----  Regarding: Diagnostic mammogram and ultrasound order  Contact: [phone]  Please send the order for this study ASAP to [facility] breast services so I can schedule this test. They have openings next week but need to order to schedule. [facility] can’t do this until August 10 and that is not acceptable if they think I could have breast cancer. FAX order to [phone]. Please confirm that you have done this. Thank you!

## 2023-07-10 ENCOUNTER — RA CDI HCC (OUTPATIENT)
Dept: OTHER | Facility: HOSPITAL | Age: 63
End: 2023-07-10

## 2023-07-10 NOTE — PROGRESS NOTES
[facility] [address] [facility] coding opportunities       Chart reviewed, no opportunity found: CHART REVIEWED, NO OPPORTUNITY FOUND        Patients Insurance        Commercial Insurance: Earnest Pruitt

## 2023-07-12 PROBLEM — D72.819 CHRONIC LEUKOPENIA: Status: ACTIVE | Noted: 2023-07-12

## 2023-07-12 PROBLEM — M15.0 PRIMARY GENERALIZED (OSTEO)ARTHRITIS: Status: ACTIVE | Noted: 2023-07-12

## 2023-07-17 PROBLEM — Z01.419 ENCOUNTER FOR ANNUAL ROUTINE GYNECOLOGICAL EXAMINATION: Status: RESOLVED | Noted: 2022-07-23 | Resolved: 2023-07-17

## 2023-07-17 PROBLEM — Z12.31 ENCOUNTER FOR SCREENING MAMMOGRAM FOR BREAST CANCER: Status: RESOLVED | Noted: 2022-07-23 | Resolved: 2023-07-17

## 2023-07-17 RX ORDER — FLUOROURACIL 50 MG/G
CREAM TOPICAL
COMMUNITY

## 2023-07-18 ENCOUNTER — OFFICE VISIT (OUTPATIENT)
Dept: FAMILY MEDICINE CLINIC | Facility: CLINIC | Age: 63
End: 2023-07-18
Payer: COMMERCIAL

## 2023-07-18 VITALS
BODY MASS INDEX: 17.82 KG/M2 | WEIGHT: 100.6 LBS | SYSTOLIC BLOOD PRESSURE: 98 MMHG | HEIGHT: 63 IN | DIASTOLIC BLOOD PRESSURE: 58 MMHG | TEMPERATURE: 98.7 F

## 2023-07-18 DIAGNOSIS — E55.9 VITAMIN D DEFICIENCY: ICD-10-CM

## 2023-07-18 DIAGNOSIS — H04.123 DRY EYE SYNDROME OF BOTH EYES: ICD-10-CM

## 2023-07-18 DIAGNOSIS — M81.0 SENILE OSTEOPOROSIS: ICD-10-CM

## 2023-07-18 DIAGNOSIS — Z00.00 PE (PHYSICAL EXAM), ANNUAL: Primary | ICD-10-CM

## 2023-07-18 DIAGNOSIS — D69.6 THROMBOCYTOPENIA (HCC): ICD-10-CM

## 2023-07-18 DIAGNOSIS — N95.2 ATROPHIC VAGINITIS: ICD-10-CM

## 2023-07-18 DIAGNOSIS — Z23 ENCOUNTER FOR IMMUNIZATION: ICD-10-CM

## 2023-07-18 PROCEDURE — 99397 PER PM REEVAL EST PAT 65+ YR: CPT | Performed by: FAMILY MEDICINE

## 2023-07-18 PROCEDURE — 90471 IMMUNIZATION ADMIN: CPT

## 2023-07-18 PROCEDURE — 90677 PCV20 VACCINE IM: CPT

## 2023-07-18 NOTE — PROGRESS NOTES
ADULT ANNUAL [facility] PRIMARY [facility]ARE    NAME: Colby Dubon  AGE: 70 y.o. 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 Works at [employer]    1. Annual PE -healthy 24-year-old female; Prevnar 20 today    2. Osteoporosis -she remains on Evenity with plan to transition to antiresorptive. Follow-up with Dr. Angel Badillo. 3.  Dry eye syndrome -Restasis    4. Atrophic vaginitis -estrogen vaginal cream; Dr. Irene Tran    5. Thrombocytopenia -stable. Check CBC for stability    6. Vitamin D deficiency -check vitamin D level. Labs ordered. Call with results. Paperwork signed for employment. Annual follow-up. Problem List Items Addressed This Visit        Musculoskeletal and Integument    Senile osteoporosis       Hematopoietic and Hemostatic    Thrombocytopenia (HCC)       Genitourinary    Atrophic vaginitis       Other    Dry eye syndrome of both eyes    Vitamin D deficiency   Other Visit Diagnoses     PE (physical exam), annual    -  Primary          Immunizations and preventive care screenings were discussed with patient today. Appropriate education was printed on patient's after visit summary. Counseling:  · Exercise    BMI Counseling: Body mass index is 17.96 kg/m². The BMI is below normal. Dietary education for weight gain was provided. Rationale for BMI follow-up plan is due to patient being underweight. Depression Screening and Follow-up Plan: Patient was screened for depression during today's encounter. They screened negative with a PHQ-2 score of 0. No follow-ups on file. Chief Complaint:     Chief Complaint   Patient presents with   • Annual Exam     Pt needs forms signed for employer.        History of Present Illness:     Adult Annual Physical   Patient here for a comprehensive physical exam. The patient reports no problems. Diet and Physical Activity  · Diet/Nutrition: well balanced diet. · Exercise: moderate cardiovascular exercise. Depression Screening  PHQ-2/9 Depression Screening    Little interest or pleasure in doing things: 0 - not at all  Feeling down, depressed, or hopeless: 0 - not at all  PHQ-2 Score: 0  PHQ-2 Interpretation: Negative depression screen       General Health  · Sleep: sleeps well. · Hearing: normal - bilateral.  · Vision: no vision problems. · Dental: regular dental visits. /GYN Health  · Patient is: postmenopausal  · Last menstrual period:   · Contraceptive method:      Review of Systems:     Review of Systems   Constitutional:        See HPI   HENT: Negative for congestion, ear pain, mouth sores, sinus pressure and trouble swallowing. Eyes: Negative for discharge, redness and itching. Respiratory: Negative for apnea, cough, chest tightness, shortness of breath, wheezing and stridor. Cardiovascular: Negative for chest pain, palpitations and leg swelling. Gastrointestinal: Negative for abdominal distention, abdominal pain, blood in stool, constipation, diarrhea, nausea and vomiting. Endocrine: Negative for cold intolerance and heat intolerance. Genitourinary: Negative for difficulty urinating, dysuria, flank pain and urgency. Musculoskeletal: Negative for arthralgias and myalgias. Skin: Negative for rash. Neurological: Negative for dizziness, seizures, syncope, speech difficulty, weakness, light-headedness, numbness and headaches. Hematological: Negative for adenopathy. Psychiatric/Behavioral: Negative for agitation, behavioral problems, confusion and sleep disturbance. The patient is not nervous/anxious.        Past Medical History:     Past Medical History:   Diagnosis Date   • Age-related osteoporosis without current pathological fracture    • Anxiety    • Depression    • Dry eyes    • Seasonal allergic rhinitis       Past Surgical History: Past Surgical History:   Procedure Laterality Date   • BREAST BIOPSY Right 2007    core bx benign   • BREAST BIOPSY Left 2009    core bx benign   • COLONOSCOPY     • WISDOM TOOTH EXTRACTION      in her 17's      Social History:     Social History     Socioeconomic History   • Marital status: /Civil Union     Spouse name: None   • Number of children: 2   • Years of education: None   • Highest education level: None   Occupational History   • Occupation: Retired   Tobacco Use   • Smoking status: Never   • Smokeless tobacco: Never   Vaping Use   • Vaping Use: Never used   Substance and Sexual Activity   • Alcohol use: Yes     Comment: socially   • Drug use: No   • Sexual activity: Yes     Partners: Male     Birth control/protection: Post-menopausal     Comment:    Other Topics Concern   • None   Social History Narrative    Daily caffeine consumption, 1 serving a day    Exercises 3 to 4 times per week     Social Determinants of Health     Financial Resource Strain: Not on file   Food Insecurity: Not on file   Transportation Needs: Not on file   Physical Activity: Not on file   Stress: Not on file   Social Connections: Not on file   Intimate Partner Violence: Not on file   Housing Stability: Not on file      Family History:     Family History   Problem Relation Age of Onset   • ALS Mother    • Hyperlipidemia Mother    • Hypertension Father    • Osteoporosis Sister    • Osteoporosis Sister    • No Known Problems Daughter    • No Known Problems Daughter    • Diabetes Maternal Grandmother    • Heart disease Maternal Grandmother    • Stroke Maternal Grandfather    • Diabetes Paternal Grandmother    • Colon cancer Paternal Grandmother    • Heart attack Paternal Grandfather    • No Known Problems Maternal Aunt    • No Known Problems Maternal Aunt       Current Medications:     Current Outpatient Medications   Medication Sig Dispense Refill   • azelastine (OPTIVAR) 0.05 % ophthalmic solution      • Calcium-Magnesium-Vitamin D (CALCIUM 1200+D3 PO) Take by mouth     • Cholecalciferol (Vitamin D) 50 MCG (2000 UT) tablet Take 2,000 Units by mouth daily     • cycloSPORINE (RESTASIS) 0.05 % ophthalmic emulsion 1 drop daily as needed     • estradiol (Vagifem) 10 MCG TABS vaginal tablet Insert 1 tablet (10 mcg total) into the vagina once a week 13 tablet 3   • fluorouracil (EFUDEX) 5 % cream APPLY A THIN LAYER TO SPOT ON LEFT CHEEK TWICE A DAY FOR 2 WEEKS     • Multiple Vitamin (MULTIVITAMINS PO) Take by mouth     • Romosozumab-aqqg (EVENITY SC) Evenity 210 mg/2.34 mL (105 mg/1.17 mL x 2) subcutaneous syringe     • Zinc 50 MG CAPS Take by mouth daily       Current Facility-Administered Medications   Medication Dose Route Frequency Provider Last Rate Last Admin   • romosozumab-aqqg (EVENITY) subcutaneous injection 210 mg  210 mg Subcutaneous (multi inj) Q30 Days [facility]   210 mg at 07/10/23 0476      Allergies: Allergies   Allergen Reactions   • Sulfa Antibiotics Nausea Only      Physical Exam:     BP 98/58 (BP Location: Right arm, Patient Position: Sitting, Cuff Size: Standard)   Temp 98.7 °F (37.1 °C) (Temporal)   Ht 5' 2.75" (1.594 m) Comment: on file  Wt 45.6 kg (100 lb 9.6 oz)   LMP  (LMP Unknown)   BMI 17.96 kg/m²     Physical Exam  Vitals and nursing note reviewed. Constitutional:       General: She is not in acute distress. Appearance: She is well-developed. She is not ill-appearing, toxic-appearing or diaphoretic. Eyes:      General: No scleral icterus. Conjunctiva/sclera: Conjunctivae normal.      Pupils: Pupils are equal, round, and reactive to light. Neck:      Vascular: No carotid bruit. Cardiovascular:      Rate and Rhythm: Normal rate and regular rhythm. Heart sounds: Normal heart sounds. No murmur heard. No friction rub. No gallop. Pulmonary:      Effort: Pulmonary effort is normal. No respiratory distress. Breath sounds: Normal breath sounds.  No wheezing or rales. Abdominal:      General: Abdomen is flat. Bowel sounds are normal. There is no distension. Palpations: Abdomen is soft. There is no mass. Tenderness: There is no abdominal tenderness. There is no guarding or rebound. Hernia: No hernia is present. Comments: Waist circumference 25 inches   Musculoskeletal:         General: Normal range of motion. Cervical back: Normal range of motion and neck supple. No tenderness. Right lower leg: No edema. Left lower leg: No edema. Lymphadenopathy:      Cervical: No cervical adenopathy. Skin:     General: Skin is warm and dry. Coloration: Skin is not jaundiced. Neurological:      General: No focal deficit present. Mental Status: She is alert and oriented to person, place, and time. Psychiatric:         Mood and Affect: Mood normal.         Behavior: Behavior normal.         Thought Content:  Thought content normal.         Judgment: Judgment normal.          Josy Garcia MA  [facility] PRIMARY [facility]

## 2023-07-27 ENCOUNTER — APPOINTMENT (OUTPATIENT)
Dept: LAB | Facility: MEDICAL CENTER | Age: 63
End: 2023-07-27
Payer: COMMERCIAL

## 2023-07-27 DIAGNOSIS — D69.6 THROMBOCYTOPENIA (HCC): ICD-10-CM

## 2023-07-27 DIAGNOSIS — N95.2 ATROPHIC VAGINITIS: ICD-10-CM

## 2023-07-27 DIAGNOSIS — E55.9 VITAMIN D DEFICIENCY: ICD-10-CM

## 2023-07-27 DIAGNOSIS — Z00.00 PE (PHYSICAL EXAM), ANNUAL: ICD-10-CM

## 2023-07-27 DIAGNOSIS — H04.123 DRY EYE SYNDROME OF BOTH EYES: ICD-10-CM

## 2023-07-27 DIAGNOSIS — M81.0 SENILE OSTEOPOROSIS: ICD-10-CM

## 2023-07-27 LAB
25(OH)D3 SERPL-MCNC: 49.8 NG/ML (ref 30–100)
ALBUMIN SERPL BCP-MCNC: 3.8 G/DL (ref 3.5–5)
ALP SERPL-CCNC: 72 U/L (ref 46–116)
ALT SERPL W P-5'-P-CCNC: 28 U/L (ref 12–78)
ANION GAP SERPL CALCULATED.3IONS-SCNC: 1 MMOL/L
AST SERPL W P-5'-P-CCNC: 26 U/L (ref 5–45)
BASOPHILS # BLD AUTO: 0.03 THOUSANDS/ÂΜL (ref 0–0.1)
BASOPHILS NFR BLD AUTO: 1 % (ref 0–1)
BILIRUB SERPL-MCNC: 0.5 MG/DL (ref 0.2–1)
BUN SERPL-MCNC: 18 MG/DL (ref 5–25)
CALCIUM SERPL-MCNC: 9.7 MG/DL (ref 8.3–10.1)
CHLORIDE SERPL-SCNC: 109 MMOL/L (ref 96–108)
CHOLEST SERPL-MCNC: 184 MG/DL
CO2 SERPL-SCNC: 29 MMOL/L (ref 21–32)
CREAT SERPL-MCNC: 1.01 MG/DL (ref 0.6–1.3)
EOSINOPHIL # BLD AUTO: 0.06 THOUSAND/ÂΜL (ref 0–0.61)
EOSINOPHIL NFR BLD AUTO: 1 % (ref 0–6)
ERYTHROCYTE [DISTWIDTH] IN BLOOD BY AUTOMATED COUNT: 13.4 % (ref 11.6–15.1)
GFR SERPL CREATININE-BSD FRML MDRD: 58 ML/MIN/1.73SQ M
GLUCOSE P FAST SERPL-MCNC: 96 MG/DL (ref 65–99)
HCT VFR BLD AUTO: 41.3 % (ref 34.8–46.1)
HDLC SERPL-MCNC: 83 MG/DL
HGB BLD-MCNC: 13 G/DL (ref 11.5–15.4)
IMM GRANULOCYTES # BLD AUTO: 0.02 THOUSAND/UL (ref 0–0.2)
IMM GRANULOCYTES NFR BLD AUTO: 1 % (ref 0–2)
LDLC SERPL CALC-MCNC: 86 MG/DL (ref 0–100)
LYMPHOCYTES # BLD AUTO: 1.06 THOUSANDS/ÂΜL (ref 0.6–4.47)
LYMPHOCYTES NFR BLD AUTO: 25 % (ref 14–44)
MCH RBC QN AUTO: 29.3 PG (ref 26.8–34.3)
MCHC RBC AUTO-ENTMCNC: 31.5 G/DL (ref 31.4–37.4)
MCV RBC AUTO: 93 FL (ref 82–98)
MONOCYTES # BLD AUTO: 0.46 THOUSAND/ÂΜL (ref 0.17–1.22)
MONOCYTES NFR BLD AUTO: 11 % (ref 4–12)
NEUTROPHILS # BLD AUTO: 2.55 THOUSANDS/ÂΜL (ref 1.85–7.62)
NEUTS SEG NFR BLD AUTO: 61 % (ref 43–75)
NONHDLC SERPL-MCNC: 101 MG/DL
NRBC BLD AUTO-RTO: 0 /100 WBCS
PLATELET # BLD AUTO: 119 THOUSANDS/UL (ref 149–390)
POTASSIUM SERPL-SCNC: 5 MMOL/L (ref 3.5–5.3)
PROT SERPL-MCNC: 7.3 G/DL (ref 6.4–8.4)
RBC # BLD AUTO: 4.43 MILLION/UL (ref 3.81–5.12)
SODIUM SERPL-SCNC: 139 MMOL/L (ref 135–147)
T4 SERPL-MCNC: 7.13 UG/DL (ref 6.09–12.23)
TRIGL SERPL-MCNC: 74 MG/DL
TSH SERPL DL<=0.05 MIU/L-ACNC: 2.43 UIU/ML (ref 0.45–4.5)
WBC # BLD AUTO: 4.18 THOUSAND/UL (ref 4.31–10.16)

## 2023-07-27 PROCEDURE — 80053 COMPREHEN METABOLIC PANEL: CPT

## 2023-07-27 PROCEDURE — 84436 ASSAY OF TOTAL THYROXINE: CPT

## 2023-07-27 PROCEDURE — 80061 LIPID PANEL: CPT

## 2023-07-27 PROCEDURE — 84443 ASSAY THYROID STIM HORMONE: CPT

## 2023-07-27 PROCEDURE — 85025 COMPLETE CBC W/AUTO DIFF WBC: CPT

## 2023-07-27 PROCEDURE — 82306 VITAMIN D 25 HYDROXY: CPT

## 2023-07-27 PROCEDURE — 36415 COLL VENOUS BLD VENIPUNCTURE: CPT

## 2023-08-16 PROBLEM — Z01.419 ENCOUNTER FOR ANNUAL ROUTINE GYNECOLOGICAL EXAMINATION: Status: ACTIVE | Noted: 2023-08-16

## 2023-08-16 PROBLEM — Z12.31 ENCOUNTER FOR SCREENING MAMMOGRAM FOR BREAST CANCER: Status: ACTIVE | Noted: 2023-08-16

## 2023-08-16 NOTE — PROGRESS NOTES
Assessment/Plan:  Normal gynecological exam  CoTesting '20- ASCUS/HPV neg; repeat Pap alone '21- ASCUS. Neg Co-Testing '22 - repeat Pap alone '25  Osteoporosis- Evenity 11/22 through rheumatologist ; poorly tolerated Boniva in 2012- Vaginal Dryness- Vagifem 1/wk. Was to go back to twice a week if this Pap smear '22 returned ASCUS. Most likely would decline a colposcopy, would have repeated the Pap smear in 6 month after using Vagifem 2/wk  Return to office in 1 year  Annual 3-D mammography  Dense Breasts - ABUS explained, ordered. Had been going for Mammo q 2yrs. Nl ABUS, Abn L M 6/23 - nl Dx'ic - return to screening  Exercise 3 days a week- she does 5  Calcium 1200 mg daily with vitamin D  Colonoscopy - 2021- planned for later in the year.      Depression screen: Neg        Diagnoses and all orders for this visit:    Encounter for annual routine gynecological examination    Encounter for screening mammogram for breast cancer  -     Mammo screening bilateral w 3d & cad; Future    Dense breast tissue on mammogram  -     US breast screening bilateral complete (ABUS); Future    Atrophic vaginitis  -     estradiol (Vagifem) 10 MCG TABS vaginal tablet; Insert 1 tablet (10 mcg total) into the vagina 2 (two) times a week    Current use of estrogen therapy              Subjective:        Patient ID: Marzena Vasques is a 70 y.o. female. Francisco Espinoza returns for an annual visit. She has no complaints. She remains sexually active. She continues to use the Vagifem once a week. She tried to wean herself off of this but dyspareunia ensued. She was hoping her breast would be less dense and thought it may have been related to why she needed a diagnostic study. She transferred her radiology care from [facility] to [facility]. They did not feel that she had dense breasts and therefore do not recommend automated breast ultrasounds. She will be retiring in December.   They are about to leave for [facility] to visit the girls.      The following portions of the patient's history were reviewed and updated as appropriate: She  has a past medical history of Age-related osteoporosis without current pathological fracture, Anxiety, Depression, Dry eyes, and Seasonal allergic rhinitis. Patient Active Problem List    Diagnosis Date Noted   • Encounter for annual routine gynecological examination 08/16/2023   • Encounter for screening mammogram for breast cancer 08/16/2023   • Primary generalized (osteo)arthritis 07/12/2023   • Chronic leukopenia 07/12/2023   • Senile osteoporosis 08/01/2022   • ASCUS of cervix with negative high risk HPV 07/23/2022   • Dense breast tissue on mammogram 07/23/2022   • Current use of estrogen therapy 07/23/2022   • Adenomatous polyp 07/09/2022   • Vitamin D deficiency 07/05/2021   • Thrombocytopenia ([address]) 07/05/2021   • Allergic rhinitis 02/05/2019   • Dry eye syndrome of both eyes 02/05/2019   • Atrophic vaginitis 02/05/2019   PMH:  G3, P2; SAVD x 2, '88, 12/90, Spont Ab '89  Vulvitis 1/10- Lidex cream  R Breast Bx '99  L Stereotactic Bx- 1/09  Dry Eyes '09  Lyme's Disease 8/12  Osteoporosis- Boniva for 1 yr, d/c'd due to SE's- flu-like sx's '11. Declines BMD- would not treat. Dyspareunia/Vaginal Dryness- '14 Vagifem SE's '15, trial of Estrace '16, back to Vagifem 1/wk       Reactive depression 12/17- alf/'s illness.      Pneumonia RLL 1/19      Bronchitis 2/20      Pneumonia 3/20      Covid 4/21, 6/22  She  has a past surgical history that includes [place] tooth extraction; Breast biopsy (Right, 2007); Breast biopsy (Left, 2009); and Colonoscopy.   Her family history includes ALS in her mother; Colon cancer in her paternal grandmother; Diabetes in her maternal grandmother and paternal grandmother; Heart attack in her paternal grandfather; Heart disease in her maternal grandmother; Hyperlipidemia in her mother; Hypertension in her father; No Known Problems in her daughter, daughter, maternal aunt, and maternal aunt; Osteoporosis in her sister and sister; Stroke in her maternal grandfather. FH:  Mother  age 66 from [address], she also had hypercholesterolemia  Father had hypercholesterolemia   MGF- CVA  PGF- HD  PGM- Colon Ca  MA- d 89 Parkinson's and dementia '20  Several females have Osteoporosis- no Fx's. She  reports that she has never smoked. She has never used smokeless tobacco. She reports current alcohol use. She reports that she does not use drugs. SH:  Internist.  Juanito Hugo. Cordell Major has 3 children , Princess Amezcua (who I delivered) is , teaching in [facility] is the debate , and has 2 children. Yaneth and Tory live in [place] 40 minutes apart.  Jose R survived cancer and has adrenal insufficiency and associated depression.  senior living plans changed. Maude Tabor returned to [place] working 3 days a week. Now it's 5d/wk.   Jose R has adjusted to [id number] visited the girls spring '22 during the war. They will be going to [facility] for a month soon. She will be retiring .   Current Outpatient Medications   Medication Sig Dispense Refill   • azelastine (OPTIVAR) 0.05 % ophthalmic solution      • Calcium-Magnesium-Vitamin D (CALCIUM 1200+D3 PO) Take by mouth     • Cholecalciferol (Vitamin D) 50 MCG (2000 UT) tablet Take 2,000 Units by mouth daily     • cycloSPORINE (RESTASIS) 0.05 % ophthalmic emulsion 1 drop daily as needed     • estradiol (Vagifem) 10 MCG TABS vaginal tablet Insert 1 tablet (10 mcg total) into the vagina 2 (two) times a week 26 tablet 3   • Multiple Vitamin (MULTIVITAMINS PO) Take by mouth     • Zinc 50 MG CAPS Take by mouth daily       Current Facility-Administered Medications   Medication Dose Route Frequency Provider Last Rate Last Admin   • romosozumab-aqqg (EVENITY) subcutaneous injection 210 mg  210 mg Subcutaneous (multi inj) Q30 Days Ubaldo Alexander PA-C   210 mg at 23 1413     Current Outpatient Medications on File Prior to Visit Medication Sig   • azelastine (OPTIVAR) 0.05 % ophthalmic solution    • Calcium-Magnesium-Vitamin D (CALCIUM 1200+D3 PO) Take by mouth   • Cholecalciferol (Vitamin D) 50 MCG (2000 UT) tablet Take 2,000 Units by mouth daily   • cycloSPORINE (RESTASIS) 0.05 % ophthalmic emulsion 1 drop daily as needed   • Multiple Vitamin (MULTIVITAMINS PO) Take by mouth   • Zinc 50 MG CAPS Take by mouth daily   • [DISCONTINUED] estradiol (Vagifem) 10 MCG TABS vaginal tablet Insert 1 tablet (10 mcg total) into the vagina once a week (Patient taking differently: Insert 1 tablet into the vagina 2 (two) times a week)   • [DISCONTINUED] fluorouracil (EFUDEX) 5 % cream APPLY A THIN LAYER TO SPOT ON LEFT CHEEK TWICE A DAY FOR 2 WEEKS     Current Facility-Administered Medications on File Prior to Visit   Medication   • romosozumab-aqqg (EVENITY) subcutaneous injection 210 mg     She is allergic to sulfa antibiotics. .    Review of Systems   Constitutional: Negative for activity change, appetite change, fatigue and unexpected weight change. Eyes: Negative for visual disturbance. Respiratory: Negative for cough, chest tightness, shortness of breath and wheezing. Cardiovascular: Negative for chest pain, palpitations and leg swelling. Breast: Patient denies tenderness, nipple discharge, masses, or erythema. Gastrointestinal: Negative for abdominal distention, abdominal pain, blood in stool, constipation, diarrhea, nausea and vomiting. Endocrine: Negative for cold intolerance and heat intolerance. Genitourinary: Negative for decreased urine volume, difficulty urinating, dyspareunia, dysuria, frequency, hematuria, menstrual problem, pelvic pain, urgency, vaginal bleeding, vaginal discharge and vaginal pain. [place] once a week. They use a lubricant. Stress incontinence if her bladder is full. This is rare. Musculoskeletal: Negative for arthralgias. Skin: Negative for rash.    Neurological: Negative for weakness, light-headedness, numbness and headaches. Hematological: Does not bruise/bleed easily. Psychiatric/Behavioral: Negative for agitation, behavioral problems and sleep disturbance. The patient is not nervous/anxious. Objective:    Vitals:    08/17/23 0850   BP: 100/70   BP Location: Left arm   Patient Position: Sitting   Cuff Size: Standard   Weight: 44.9 kg (99 lb)   Height: 5' 2" (1.575 m)            Physical Exam  Vitals and nursing note reviewed. Exam conducted with a chaperone present. Constitutional:       General: She is not in acute distress. Appearance: She is well-developed. HENT:      Head: Normocephalic and atraumatic. Eyes:      General: No scleral icterus. Right eye: No discharge. Left eye: No discharge. Extraocular Movements: Extraocular movements intact. Conjunctiva/sclera: Conjunctivae normal.   Neck:      Thyroid: No thyromegaly. Trachea: No tracheal deviation. Cardiovascular:      Rate and Rhythm: Normal rate and regular rhythm. Heart sounds: Normal heart sounds. No murmur heard. Pulmonary:      Effort: Pulmonary effort is normal. No respiratory distress. Breath sounds: Normal breath sounds. No wheezing. Chest:   Breasts:     Breasts are symmetrical.      Right: No inverted nipple, mass, nipple discharge, skin change or tenderness. Left: No inverted nipple, mass, nipple discharge, skin change or tenderness. Abdominal:      General: Bowel sounds are normal. There is no distension. Palpations: Abdomen is soft. There is no mass. Tenderness: There is no abdominal tenderness. There is no guarding or rebound. Genitourinary:     General: Normal vulva. Labia:         Right: No rash, tenderness or lesion. Left: No rash, tenderness or lesion. Vagina: Normal.      Cervix: No cervical motion tenderness or discharge. Uterus: Not deviated, not enlarged and not tender.        Adnexa: Right: No mass, tenderness or fullness. Left: No mass, tenderness or fullness. Rectum: No external hemorrhoid. Comments: Urethral meatus within normal limits. Perineum within normal limits. Bladder well supported. Physiologic vaginal atrophy. Musculoskeletal:         General: No tenderness. Normal range of motion. Cervical back: Normal range of motion and neck supple. Lymphadenopathy:      Cervical: No cervical adenopathy. Skin:     General: Skin is warm and dry. Neurological:      Mental Status: She is alert and oriented to person, place, and time. Psychiatric:         Mood and Affect: Mood normal.         Behavior: Behavior normal.         Thought Content:  Thought content normal.         Judgment: Judgment normal.

## 2023-08-17 ENCOUNTER — ANNUAL EXAM (OUTPATIENT)
Dept: OBGYN CLINIC | Facility: CLINIC | Age: 63
End: 2023-08-17
Payer: COMMERCIAL

## 2023-08-17 VITALS
SYSTOLIC BLOOD PRESSURE: 100 MMHG | WEIGHT: 99 LBS | DIASTOLIC BLOOD PRESSURE: 70 MMHG | BODY MASS INDEX: 18.22 KG/M2 | HEIGHT: 62 IN

## 2023-08-17 DIAGNOSIS — Z01.419 ENCOUNTER FOR ANNUAL ROUTINE GYNECOLOGICAL EXAMINATION: Primary | ICD-10-CM

## 2023-08-17 DIAGNOSIS — N95.2 ATROPHIC VAGINITIS: ICD-10-CM

## 2023-08-17 DIAGNOSIS — R92.2 DENSE BREAST TISSUE ON MAMMOGRAM: ICD-10-CM

## 2023-08-17 DIAGNOSIS — Z79.899 CURRENT USE OF ESTROGEN THERAPY: ICD-10-CM

## 2023-08-17 DIAGNOSIS — Z12.31 ENCOUNTER FOR SCREENING MAMMOGRAM FOR BREAST CANCER: ICD-10-CM

## 2023-08-17 PROCEDURE — S0612 ANNUAL GYNECOLOGICAL EXAMINA: HCPCS | Performed by: OBSTETRICS & GYNECOLOGY

## 2023-08-17 RX ORDER — ESTRADIOL 10 UG/1
1 INSERT VAGINAL 2 TIMES WEEKLY
Qty: 26 TABLET | Refills: 3 | Status: SHIPPED | OUTPATIENT
Start: 2023-08-17

## 2023-10-15 PROBLEM — Z01.419 ENCOUNTER FOR ANNUAL ROUTINE GYNECOLOGICAL EXAMINATION: Status: RESOLVED | Noted: 2023-08-16 | Resolved: 2023-10-15

## 2024-01-14 ENCOUNTER — TELEPHONE (OUTPATIENT)
Dept: FAMILY MEDICINE CLINIC | Facility: CLINIC | Age: 64
End: 2024-01-14

## 2024-01-14 DIAGNOSIS — R04.2 HEMOPTYSIS: Primary | ICD-10-CM

## 2024-01-15 ENCOUNTER — TELEPHONE (OUTPATIENT)
Dept: FAMILY MEDICINE CLINIC | Facility: CLINIC | Age: 64
End: 2024-01-15

## 2024-01-15 ENCOUNTER — APPOINTMENT (OUTPATIENT)
Dept: LAB | Facility: HOSPITAL | Age: 64
End: 2024-01-15
Payer: COMMERCIAL

## 2024-01-15 ENCOUNTER — HOSPITAL ENCOUNTER (OUTPATIENT)
Dept: CT IMAGING | Facility: HOSPITAL | Age: 64
Discharge: HOME | End: 2024-01-15
Payer: COMMERCIAL

## 2024-01-15 ENCOUNTER — TELEPHONE (OUTPATIENT)
Age: 64
End: 2024-01-15

## 2024-01-15 DIAGNOSIS — R04.2 HEMOPTYSIS: ICD-10-CM

## 2024-01-15 DIAGNOSIS — J18.9 MULTIFOCAL PNEUMONIA: Primary | ICD-10-CM

## 2024-01-15 LAB
ANION GAP SERPL CALCULATED.3IONS-SCNC: 4 MMOL/L
BUN SERPL-MCNC: 18 MG/DL (ref 5–25)
CALCIUM SERPL-MCNC: 9.3 MG/DL (ref 8.4–10.2)
CHLORIDE SERPL-SCNC: 104 MMOL/L (ref 96–108)
CO2 SERPL-SCNC: 31 MMOL/L (ref 21–32)
CREAT SERPL-MCNC: 0.75 MG/DL (ref 0.6–1.3)
GFR SERPL CREATININE-BSD FRML MDRD: 83 ML/MIN/1.73SQ M
GLUCOSE SERPL-MCNC: 120 MG/DL (ref 65–140)
POTASSIUM SERPL-SCNC: 4 MMOL/L (ref 3.5–5.3)
SODIUM SERPL-SCNC: 139 MMOL/L (ref 135–147)

## 2024-01-15 PROCEDURE — 80048 BASIC METABOLIC PNL TOTAL CA: CPT

## 2024-01-15 PROCEDURE — 36415 COLL VENOUS BLD VENIPUNCTURE: CPT

## 2024-01-15 PROCEDURE — G1004 CDSM NDSC: HCPCS

## 2024-01-15 PROCEDURE — 71275 CT ANGIOGRAPHY CHEST: CPT

## 2024-01-15 RX ORDER — LEVOFLOXACIN 500 MG/1
500 TABLET, FILM COATED ORAL EVERY 24 HOURS
Qty: 7 TABLET | Refills: 0 | Status: SHIPPED | OUTPATIENT
Start: 2024-01-15 | End: 2024-01-22

## 2024-01-15 RX ADMIN — IOHEXOL 85 ML: 350 INJECTION, SOLUTION INTRAVENOUS at 15:34

## 2024-01-15 NOTE — TELEPHONE ENCOUNTER
Called patient to review her recent CTA lung, after I spoke with her yesterday complaining of hemoptysis. Cough has worsened since she called yesterday.    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  She developed an upper respiratory infection at that time, including cough.  The symptoms mostly resolved spontaneously without the addition of antibiotics.  Since then, she feels she did not completely return to baseline.  Cough is intermittent and has never resolved completely.  In November, she had an episode of hemoptysis and saw ENT.  Nasolaryngoscopy at that time was negative.    Yesterday, patient had another episode of hemoptysis.  She had a mild cough which, as above, has been intermittent for a few months.  CTA was ordered for evaluation.    Assessment:    CTA revealing multifocal pneumonia and nodules  Second incident of hemoptysis within the last 3 months since former URI      Plan:    Start Levaquin 500 mg daily for 7 days   Patient to be seen in office tomorrow for physical exam: attn LUNG  PPD to be placed  Will check culture to rule out COVID, RSV, Influenza - to determine if she might benefit from an antiviral, such as Tamiflu  Refer to pulmonology for consultation -will try to expedite this appointment  She will need follow-up CT to confirm resolution of findings.    Pt voices understanding and agreement of above.

## 2024-01-15 NOTE — TELEPHONE ENCOUNTER
Spoke with Nohelia this morning.  She noted hemoptysis, about 1/2 tsp of brownish blood while eating.  This has happened once before, about 2 months ago, with a URI.  At present, she feels well and does not have any other symptoms.  She did have an ENT visit after the first episode, Dr. Snow, 11/14/23. Nasolaryngoscopy was negative for bleeding source at that time.  She was scheduled to see GI the following week but cancelled appt.    A: Hemoptysis    P: CTA lung with contrast  BMP ordered

## 2024-01-16 ENCOUNTER — OFFICE VISIT (OUTPATIENT)
Dept: FAMILY MEDICINE CLINIC | Facility: CLINIC | Age: 64
End: 2024-01-16
Payer: COMMERCIAL

## 2024-01-16 ENCOUNTER — APPOINTMENT (OUTPATIENT)
Dept: LAB | Facility: CLINIC | Age: 64
End: 2024-01-16
Payer: COMMERCIAL

## 2024-01-16 VITALS
BODY MASS INDEX: 17.65 KG/M2 | HEART RATE: 72 BPM | TEMPERATURE: 97.5 F | WEIGHT: 99.6 LBS | OXYGEN SATURATION: 99 % | HEIGHT: 63 IN | DIASTOLIC BLOOD PRESSURE: 80 MMHG | SYSTOLIC BLOOD PRESSURE: 124 MMHG

## 2024-01-16 DIAGNOSIS — R04.2 HEMOPTYSIS: ICD-10-CM

## 2024-01-16 DIAGNOSIS — Z11.59 ENCOUNTER FOR HEPATITIS C SCREENING TEST FOR LOW RISK PATIENT: ICD-10-CM

## 2024-01-16 DIAGNOSIS — E55.9 VITAMIN D DEFICIENCY: ICD-10-CM

## 2024-01-16 DIAGNOSIS — R91.8 PULMONARY NODULES: ICD-10-CM

## 2024-01-16 DIAGNOSIS — Z13.220 SCREENING FOR LIPID DISORDERS: ICD-10-CM

## 2024-01-16 DIAGNOSIS — Z13.1 SCREENING FOR DIABETES MELLITUS: ICD-10-CM

## 2024-01-16 DIAGNOSIS — J18.9 MULTIFOCAL PNEUMONIA: ICD-10-CM

## 2024-01-16 DIAGNOSIS — D69.6 THROMBOCYTOPENIA (HCC): ICD-10-CM

## 2024-01-16 DIAGNOSIS — D72.819 CHRONIC LEUKOPENIA: ICD-10-CM

## 2024-01-16 DIAGNOSIS — Z00.00 WELCOME TO MEDICARE PREVENTIVE VISIT: Primary | ICD-10-CM

## 2024-01-16 LAB
BASOPHILS # BLD AUTO: 0.04 THOUSANDS/ÂΜL (ref 0–0.1)
BASOPHILS NFR BLD AUTO: 1 % (ref 0–1)
EOSINOPHIL # BLD AUTO: 0.06 THOUSAND/ÂΜL (ref 0–0.61)
EOSINOPHIL NFR BLD AUTO: 1 % (ref 0–6)
ERYTHROCYTE [DISTWIDTH] IN BLOOD BY AUTOMATED COUNT: 13.5 % (ref 11.6–15.1)
HCT VFR BLD AUTO: 39.1 % (ref 34.8–46.1)
HGB BLD-MCNC: 12.7 G/DL (ref 11.5–15.4)
IMM GRANULOCYTES # BLD AUTO: 0.03 THOUSAND/UL (ref 0–0.2)
IMM GRANULOCYTES NFR BLD AUTO: 1 % (ref 0–2)
LYMPHOCYTES # BLD AUTO: 1.08 THOUSANDS/ÂΜL (ref 0.6–4.47)
LYMPHOCYTES NFR BLD AUTO: 19 % (ref 14–44)
MCH RBC QN AUTO: 30.1 PG (ref 26.8–34.3)
MCHC RBC AUTO-ENTMCNC: 32.5 G/DL (ref 31.4–37.4)
MCV RBC AUTO: 93 FL (ref 82–98)
MONOCYTES # BLD AUTO: 0.59 THOUSAND/ÂΜL (ref 0.17–1.22)
MONOCYTES NFR BLD AUTO: 10 % (ref 4–12)
NEUTROPHILS # BLD AUTO: 3.92 THOUSANDS/ÂΜL (ref 1.85–7.62)
NEUTS SEG NFR BLD AUTO: 68 % (ref 43–75)
NRBC BLD AUTO-RTO: 0 /100 WBCS
PLATELET # BLD AUTO: 112 THOUSANDS/UL (ref 149–390)
PMV BLD AUTO: 12.7 FL (ref 8.9–12.7)
RBC # BLD AUTO: 4.22 MILLION/UL (ref 3.81–5.12)
WBC # BLD AUTO: 5.72 THOUSAND/UL (ref 4.31–10.16)

## 2024-01-16 PROCEDURE — 86803 HEPATITIS C AB TEST: CPT

## 2024-01-16 PROCEDURE — 36415 COLL VENOUS BLD VENIPUNCTURE: CPT

## 2024-01-16 PROCEDURE — G0402 INITIAL PREVENTIVE EXAM: HCPCS | Performed by: NURSE PRACTITIONER

## 2024-01-16 PROCEDURE — 86480 TB TEST CELL IMMUN MEASURE: CPT

## 2024-01-16 PROCEDURE — 87636 SARSCOV2 & INF A&B AMP PRB: CPT | Performed by: NURSE PRACTITIONER

## 2024-01-16 PROCEDURE — 99214 OFFICE O/P EST MOD 30 MIN: CPT | Performed by: NURSE PRACTITIONER

## 2024-01-16 PROCEDURE — 85025 COMPLETE CBC W/AUTO DIFF WBC: CPT

## 2024-01-16 NOTE — ASSESSMENT & PLAN NOTE
Repeat CT of the chest without contrast was ordered to be completed in 8 weeks to assess for any changes in previously noted nodules.  Pulmonology referral was also placed for follow-up regarding this.

## 2024-01-16 NOTE — ASSESSMENT & PLAN NOTE
Patient was advised to continue Levaquin until completely finished for treatment of this.  Repeat CT of the chest without contrast was ordered to be completed in 8 weeks to assess for resolution of this.

## 2024-01-16 NOTE — ASSESSMENT & PLAN NOTE
Vision exam was completed in the office today.  Patient declined EKG at this time.  Hepatitis C screening was also accepted.

## 2024-01-16 NOTE — ASSESSMENT & PLAN NOTE
QuantiFERON gold was ordered to be completed to rule out tuberculosis.  COVID/influenza PCR test was also performed in the office today.  Pulmonology referral was also placed for follow-up regarding hemoptysis.  Patient also requested GI referral so that endoscopy can be performed to assess for any upper GI causes of hemoptysis.  Patient is currently denying any GERD symptoms or GI changes.

## 2024-01-16 NOTE — PROGRESS NOTES
Assessment and Plan:     Problem List Items Addressed This Visit     Vitamin D deficiency     Well-controlled on current regimen.         Thrombocytopenia (HCC)     Repeat CBC with differential was ordered to be completed to assess the status of this.         Relevant Orders    CBC and differential    Chronic leukopenia     Repeat CBC with differential was ordered to be completed to assess the status of this.         Relevant Orders    CBC and differential    Welcome to Medicare preventive visit - Primary     Vision exam was completed in the office today.  Patient declined EKG at this time.  Hepatitis C screening was also accepted.         Hemoptysis     QuantiFERON gold was ordered to be completed to rule out tuberculosis.  COVID/influenza PCR test was also performed in the office today.  Pulmonology referral was also placed for follow-up regarding hemoptysis.  Patient also requested GI referral so that endoscopy can be performed to assess for any upper GI causes of hemoptysis.  Patient is currently denying any GERD symptoms or GI changes.         Relevant Orders    CT chest wo contrast    Ambulatory Referral to Pulmonology    Quantiferon TB Gold Plus Assay    COVID/FLU    Ambulatory Referral to Gastroenterology    Multifocal pneumonia     Patient was advised to continue Levaquin until completely finished for treatment of this.  Repeat CT of the chest without contrast was ordered to be completed in 8 weeks to assess for resolution of this.         Relevant Orders    CT chest wo contrast    Ambulatory Referral to Pulmonology    Pulmonary nodules     Repeat CT of the chest without contrast was ordered to be completed in 8 weeks to assess for any changes in previously noted nodules.  Pulmonology referral was also placed for follow-up regarding this.         Relevant Orders    CT chest wo contrast    Ambulatory Referral to Pulmonology   Other Visit Diagnoses     Screening for lipid disorders        Screening for  diabetes mellitus        Encounter for hepatitis C screening test for low risk patient        Relevant Orders    Hepatitis C antibody            Depression Screening and Follow-up Plan: Patient was screened for depression during today's encounter. They screened negative with a PHQ-2 score of 0.      Preventive health issues were discussed with patient, and age appropriate screening tests were ordered as noted in patient's After Visit Summary.  Personalized health advice and appropriate referrals for health education or preventive services given if needed, as noted in patient's After Visit Summary.     History of Present Illness:     Patient presents for a Medicare Wellness Visit    Hemoptysis/multifocal pneumonia: Patient reports that after visiting family in [facility] in September 2023 she developed an upper respiratory infection.  She reported that after some time the infection seemed to resolve on its own however, she had an episode of hemoptysis in November 2023 which prompted her to follow with an ENT specialist.  Patient did have nasolaryngoscopy completed at that time which was noted to be normal.  Patient had another episode of hemoptysis on 1/14/2024 and did contact her PCP Dr. Sims regarding this.  A CTA of the chest was completed and showed findings concerning for multifocal pneumonia (worse in lingula) - atypical organisms such as nontuberculous mycobacterium is included in the differential.  No thoracic aortic dissection was noted.  It was recommended that a follow-up CT of the chest without contrast to be completed in 8 to 12 weeks to assess for resolution of her symptoms.  Patient was also started on a 7-day course of Levaquin by her PCP Dr. Sims for treatment.  The patient currently denies any shortness of breath, dyspnea on exertion, fever, chills, night sweats, or recurrent cough.  She does report that she has been experiencing some congestion but this has been going on for a few months at this  point.    Pulmonary nodules: Scattered small pulmonary nodules were incidentally noted in bilateral upper lobes on recent CTA.    Thrombocytopenia/leukopenia: Patient has noted thrombocytopenia and leukopenia over at least the past 3 years.  The patient is not currently following with a hematologist for this.    Vitamin D deficiency: Patient's most recent vitamin D level was normal.  Patient does take a daily vitamin D supplement.       Patient Care Team:  Zoe Sims MD as PCP - General (Family Medicine)  Paul Jain MD as PCP - OBGYN (Obstetrics and Gynecology)  Zoe Sims MD as PCP - PCP-[facility]  Paul Jain MD     Review of Systems:     Review of Systems   Constitutional:  Negative for chills and fever.   HENT:  Positive for congestion and postnasal drip. Negative for ear pain, rhinorrhea, sinus pressure, sinus pain and sore throat.    Eyes:  Negative for pain and visual disturbance.   Respiratory:  Negative for cough, chest tightness, shortness of breath and wheezing.         Recent episode of hemoptysis   Cardiovascular:  Negative for chest pain, palpitations and leg swelling.   Gastrointestinal:  Negative for abdominal pain, constipation, diarrhea, nausea and vomiting.   Endocrine: Negative for cold intolerance and heat intolerance.   Genitourinary:  Negative for decreased urine volume, dysuria and hematuria.   Musculoskeletal:  Negative for arthralgias, back pain and myalgias.   Skin:  Negative for color change and rash.   Allergic/Immunologic: Positive for environmental allergies.   Neurological:  Negative for dizziness, seizures, syncope, weakness, light-headedness, numbness and headaches.   Hematological:  Negative for adenopathy.   Psychiatric/Behavioral:  Negative for confusion. The patient is not nervous/anxious.    All other systems reviewed and are negative.     Problem List:     Patient Active Problem List   Diagnosis   • Allergic rhinitis   • Dry eye syndrome of  both eyes   • Atrophic vaginitis   • Vitamin D deficiency   • Thrombocytopenia (HCC)   • Adenomatous polyp   • ASCUS of cervix with negative high risk HPV   • Dense breast tissue on mammogram   • Current use of estrogen therapy   • Senile osteoporosis   • Primary generalized (osteo)arthritis   • Chronic leukopenia   • Encounter for screening mammogram for breast cancer   • Welcome to Medicare preventive visit   • Hemoptysis   • Multifocal pneumonia   • Pulmonary nodules      Past Medical and Surgical History:     Past Medical History:   Diagnosis Date   • Age-related osteoporosis without current pathological fracture    • Anxiety    • Depression    • Dry eyes    • Seasonal allergic rhinitis      Past Surgical History:   Procedure Laterality Date   • BREAST BIOPSY Right 2007    core bx benign   • BREAST BIOPSY Left 2009    core bx benign   • COLONOSCOPY     • WISDOM TOOTH EXTRACTION      in her 20's      Family History:     Family History   Problem Relation Age of Onset   • ALS Mother    • Hyperlipidemia Mother    • Hypertension Father    • Osteoporosis Sister    • Osteoporosis Sister    • No Known Problems Daughter    • No Known Problems Daughter    • Diabetes Maternal Grandmother    • Heart disease Maternal Grandmother    • Stroke Maternal Grandfather    • Diabetes Paternal Grandmother    • Colon cancer Paternal Grandmother    • Heart attack Paternal Grandfather    • No Known Problems Maternal Aunt    • No Known Problems Maternal Aunt       Social History:     Social History     Socioeconomic History   • Marital status: /Civil Union     Spouse name: None   • Number of children: 2   • Years of education: None   • Highest education level: None   Occupational History   • Occupation: Retired   Tobacco Use   • Smoking status: Never   • Smokeless tobacco: Never   Vaping Use   • Vaping status: Never Used   Substance and Sexual Activity   • Alcohol use: Yes     Comment: socially   • Drug use: No   • Sexual activity:  Yes     Partners: Male     Birth control/protection: Post-menopausal     Comment:    Other Topics Concern   • None   Social History Narrative    Daily caffeine consumption, 1 serving a day    Exercises 3 to 4 times per week     Social Determinants of Health     Financial Resource Strain: Low Risk  (1/16/2024)    Overall Financial Resource Strain (CARDIA)    • Difficulty of Paying Living Expenses: Not hard at all   Food Insecurity: Not on file   Transportation Needs: No Transportation Needs (1/16/2024)    PRAPARE - Transportation    • Lack of Transportation (Medical): No    • Lack of Transportation (Non-Medical): No   Physical Activity: Not on file   Stress: Not on file   Social Connections: Not on file   Intimate Partner Violence: Not on file   Housing Stability: Not on file      Medications and Allergies:     Current Outpatient Medications   Medication Sig Dispense Refill   • azelastine (OPTIVAR) 0.05 % ophthalmic solution      • Calcium-Magnesium-Vitamin D (CALCIUM 1200+D3 PO) Take by mouth     • Cholecalciferol (Vitamin D) 50 MCG (2000 UT) tablet Take 2,000 Units by mouth daily     • cycloSPORINE (RESTASIS) 0.05 % ophthalmic emulsion 1 drop daily as needed     • estradiol (Vagifem) 10 MCG TABS vaginal tablet Insert 1 tablet (10 mcg total) into the vagina 2 (two) times a week 26 tablet 3   • levofloxacin (LEVAQUIN) 500 mg tablet Take 1 tablet (500 mg total) by mouth every 24 hours for 7 days 7 tablet 0   • Multiple Vitamin (MULTIVITAMINS PO) Take by mouth       No current facility-administered medications for this visit.     Allergies   Allergen Reactions   • Sulfa Antibiotics Nausea Only      Immunizations:     Immunization History   Administered Date(s) Administered   • COVID-19 MODERNA VACC 0.5 ML IM 01/04/2021, 01/04/2021, 02/01/2021, 02/01/2021, 08/30/2021   • COVID-19 PFIZER VACCINE 0.3 ML IM 11/11/2022   • COVID-19 Pfizer mRNA vacc PF tess-sucrose 12 yr and older (Comirnaty) 11/10/2023   • INFLUENZA  10/25/2017, 10/11/2018, 10/09/2019, 10/12/2020, 10/19/2021, 10/20/2022, 11/07/2023   • Influenza, seasonal, injectable 10/25/2017, 10/19/2021   • Pneumococcal Conjugate Vaccine 20-valent (Pcv20), Polysace 07/18/2023   • Pneumococcal Polysaccharide PPV23 06/30/2020   • Tdap 09/09/2019   • Zoster 03/11/2022   • Zoster Vaccine Recombinant 12/20/2021      Health Maintenance:         Topic Date Due   • Hepatitis C Screening  Never done   • HIV Screening  Never done   • Breast Cancer Screening: Mammogram  06/26/2024   • Colorectal Cancer Screening  12/13/2031         Topic Date Due   • COVID-19 Vaccine (8 - 2023-24 season) 01/05/2024      Medicare Screening Tests and Risk Assessments:     Chinyere is here for her Welcome to Medicare visit.     Health Risk Assessment:   Patient rates overall health as good. Patient feels that their physical health rating is same. Patient is very satisfied with their life. Eyesight was rated as same. Hearing was rated as same. Patient feels that their emotional and mental health rating is same. Patients states they are never, rarely angry. Patient states they are never, rarely unusually tired/fatigued. Pain experienced in the last 7 days has been none. Patient states that she has experienced no weight loss or gain in last 6 months.     Depression Screening:   PHQ-2 Score: 0      Fall Risk Screening:   In the past year, patient has experienced: no history of falling in past year      Urinary Incontinence Screening:   Patient has not leaked urine accidently in the last six months.     Home Safety:  Patient does not have trouble with stairs inside or outside of their home. Patient has working smoke alarms and has working carbon monoxide detector. Home safety hazards include: none.     Nutrition:   Current diet is Regular.     Medications:   Patient is currently taking over-the-counter supplements. OTC medications include: see medication list. Patient is able to manage medications.      Activities of Daily Living (ADLs)/Instrumental Activities of Daily Living (IADLs):   Walk and transfer into and out of bed and chair?: Yes  Dress and groom yourself?: Yes    Bathe or shower yourself?: Yes    Feed yourself? Yes  Do your laundry/housekeeping?: Yes  Manage your money, pay your bills and track your expenses?: Yes  Make your own meals?: Yes    Do your own shopping?: Yes    Previous Hospitalizations:   Any hospitalizations or ED visits within the last 12 months?: No      Advance Care Planning:   Living will: Yes    Durable POA for healthcare: Yes    Advanced directive: Yes    Advanced directive counseling given: Yes      Cognitive Screening:   Provider or family/friend/caregiver concerned regarding cognition?: No    PREVENTIVE SCREENINGS      Cardiovascular Screening:    General: Screening Current      Diabetes Screening:     General: Screening Current      Colorectal Cancer Screening:     General: Screening Current      Breast Cancer Screening:     General: Screening Current      Cervical Cancer Screening:    General: Screening Not Indicated      Osteoporosis Screening:    General: Screening Not Indicated and History Osteoporosis      Abdominal Aortic Aneurysm (AAA) Screening:        General: Screening Not Indicated      Lung Cancer Screening:     General: Screening Not Indicated      Hepatitis C Screening:    General: Risks and Benefits Discussed    Hep C Screening Accepted: Yes      Screening, Brief Intervention, and Referral to Treatment (SBIRT)    Screening  Typical number of drinks in a day: 0  Typical number of drinks in a week: 0  Interpretation: Low risk drinking behavior.    Single Item Drug Screening:  How often have you used an illegal drug (including marijuana) or a prescription medication for non-medical reasons in the past year? never    Single Item Drug Screen Score: 0  Interpretation: Negative screen for possible drug use disorder    Other Counseling Topics:   Car/seat belt/driving  "safety, skin self-exam, sunscreen and calcium and vitamin D intake and regular weightbearing exercise.     Vision Screening    Right eye Left eye Both eyes   Without correction 20/30 20/40 20/20   With correction      Comments: Pt did not have glasses        Physical Exam:     /80 (BP Location: Right arm, Patient Position: Sitting, Cuff Size: Standard)   Pulse 72   Temp 97.5 °F (36.4 °C)   Ht 5' 2.5\" (1.588 m) Comment: on file  Wt 45.2 kg (99 lb 9.6 oz)   LMP  (LMP Unknown)   SpO2 99%   BMI 17.93 kg/m²     Physical Exam  Vitals and nursing note reviewed.   Constitutional:       General: She is not in acute distress.     Appearance: Normal appearance. She is well-developed. She is not ill-appearing.   HENT:      Head: Normocephalic.   Eyes:      Conjunctiva/sclera: Conjunctivae normal.   Cardiovascular:      Rate and Rhythm: Normal rate and regular rhythm.      Pulses: Normal pulses.           Carotid pulses are 2+ on the right side and 2+ on the left side.       Radial pulses are 2+ on the right side and 2+ on the left side.        Posterior tibial pulses are 2+ on the right side and 2+ on the left side.      Heart sounds: Normal heart sounds. No murmur heard.  Pulmonary:      Effort: Pulmonary effort is normal. No respiratory distress.      Breath sounds: Examination of the right-lower field reveals rales. Examination of the left-lower field reveals rales. Rales (slight) present. No decreased breath sounds, wheezing or rhonchi.      Comments: Some slight crackles noted in the bilateral lower bases.  Abdominal:      General: Abdomen is flat. Bowel sounds are normal. There is no distension.      Palpations: Abdomen is soft.      Tenderness: There is no abdominal tenderness. There is no guarding.   Musculoskeletal:         General: No swelling. Normal range of motion.      Cervical back: Normal range of motion and neck supple.      Right lower leg: No edema.      Left lower leg: No edema.   Skin:     " General: Skin is warm and dry.      Capillary Refill: Capillary refill takes less than 2 seconds.   Neurological:      General: No focal deficit present.      Mental Status: She is alert and oriented to person, place, and time.   Psychiatric:         Mood and Affect: Mood normal.         Behavior: Behavior normal.         Thought Content: Thought content normal.         Judgment: Judgment normal.          FABRICE De La Cruz

## 2024-01-17 ENCOUNTER — CONSULT (OUTPATIENT)
Dept: PULMONOLOGY | Facility: CLINIC | Age: 64
End: 2024-01-17
Payer: COMMERCIAL

## 2024-01-17 VITALS
OXYGEN SATURATION: 99 % | HEART RATE: 77 BPM | WEIGHT: 103 LBS | BODY MASS INDEX: 18.25 KG/M2 | TEMPERATURE: 96.8 F | SYSTOLIC BLOOD PRESSURE: 122 MMHG | DIASTOLIC BLOOD PRESSURE: 70 MMHG | HEIGHT: 63 IN

## 2024-01-17 DIAGNOSIS — R05.3 CHRONIC COUGH: Primary | ICD-10-CM

## 2024-01-17 DIAGNOSIS — R04.2 HEMOPTYSIS: ICD-10-CM

## 2024-01-17 DIAGNOSIS — J18.9 MULTIFOCAL PNEUMONIA: ICD-10-CM

## 2024-01-17 DIAGNOSIS — R91.8 PULMONARY NODULES: ICD-10-CM

## 2024-01-17 LAB
FLUAV RNA RESP QL NAA+PROBE: NEGATIVE
FLUBV RNA RESP QL NAA+PROBE: NEGATIVE
GAMMA INTERFERON BACKGROUND BLD IA-ACNC: <0 IU/ML
HCV AB SER QL: NORMAL
M TB IFN-G BLD-IMP: NEGATIVE
M TB IFN-G CD4+ BCKGRND COR BLD-ACNC: 0 IU/ML
M TB IFN-G CD4+ BCKGRND COR BLD-ACNC: 0 IU/ML
MITOGEN IGNF BCKGRD COR BLD-ACNC: 7.81 IU/ML
SARS-COV-2 RNA RESP QL NAA+PROBE: NEGATIVE

## 2024-01-17 PROCEDURE — 99205 OFFICE O/P NEW HI 60 MIN: CPT | Performed by: INTERNAL MEDICINE

## 2024-01-17 RX ORDER — ALBUTEROL SULFATE 90 UG/1
2 AEROSOL, METERED RESPIRATORY (INHALATION) EVERY 6 HOURS PRN
Qty: 8.5 G | Refills: 2 | Status: SHIPPED | OUTPATIENT
Start: 2024-01-17 | End: 2024-01-23 | Stop reason: SDUPTHER

## 2024-01-17 RX ORDER — FLUTICASONE PROPIONATE 50 MCG
1 SPRAY, SUSPENSION (ML) NASAL DAILY
Qty: 11.1 ML | Refills: 2 | Status: SHIPPED | OUTPATIENT
Start: 2024-01-17

## 2024-01-17 NOTE — PROGRESS NOTES
Consultation - Pulmonary Medicine   Chinyere Crystal 63 y.o. female MRN: [id number]    Physician Requesting Consult: Perfecto Petty  Reason for Consult: hemotysis    Chinyere Crystal is a 63 y.o. female hx rhinitis, pulm nodules, PNA, chronic thrombocytopenia, nonsmoker, who presents for evaluation of hemoptysis.    # PNA  # Mild lingula bronchiolectasis  # Hemoptysis  # Chronic Cough  # Mild thrombocytopenia  Non smoker with mild non productive throat clearing since December, initially thought to be post nasal drip. Had 2 episodes waking up with blood in the back of her throat without epistaxis, normal ENT exam, and no clear signs of GI bleed (no hematemesis, hematochezia, abd pain). Second episode in setting of worsening cough and some URI symptoms and CT showing small lingular consolidation and bronchiolectasis. Since starting antibiotics minimal cough and no further episodes of bleeding. Inflammation from PNA + bronchial infection can cause bleeding, especially in setting of bronchiolectasis and thrombocytopenia. Cause of bronchiolectasis likely prior infection, though older thin woman also at risk for MAC and bronchiectasis. Immunoglobulins in 2022 normal.     - Complete 7 days levaquin  - follow up CT in 8-12 weeks  --- discussed risks and benefits of mucous clearance therapy, would hols off for nos since minimal cough with antibiotics   - fu QF gold  - start flonase trial (can do with saline spray) for chronic cough  - start albuterol prn  - recommend obtaining CD of imaging to bring to [facility] with her  - consider completing GI workup for blood in the back of the throat  - ED precautions; if repeat hemoptysis with > 1  teaspoon or repeat episodes, present to ED for urgent imaging and possible IR embolization    Vaccines  Immunization History   Administered Date(s) Administered    COVID-19 MODERNA VACC 0.5 ML IM 01/04/2021, 01/04/2021, 02/01/2021, 02/01/2021, 08/30/2021    COVID-19 PFIZER VACCINE 0.3 ML IM  11/11/2022    COVID-19 Pfizer mRNA vacc PF tess-sucrose 12 yr and older (Comirnaty) 11/10/2023    INFLUENZA 10/25/2017, 10/11/2018, 10/09/2019, 10/12/2020, 10/19/2021, 10/20/2022, 11/07/2023    Influenza, seasonal, injectable 10/25/2017, 10/19/2021    Pneumococcal Conjugate Vaccine 20-valent (Pcv20), Polysace 07/18/2023    Pneumococcal Polysaccharide PPV23 06/30/2020    Tdap 09/09/2019    Zoster 03/11/2022    Zoster Vaccine Recombinant 12/20/2021        I have spent a total time of 55 minutes on 01/17/24 in caring for this patient including Diagnostic results, Prognosis, Risks and benefits of tx options, Instructions for management, Patient and family education, Impressions, Counseling / Coordination of care, Documenting in the medical record, Reviewing / ordering tests, medicine, procedures  , and Obtaining or reviewing history  .   ______________________________________________________________________    HPI:    Chinyere Crystal is a 63 y.o. female  hx rhinitis, pulm nodules, PNA, chronic thrombocytopenia, who presents for evaluation of hemoptysis.    Nonsmoker  Pt had a URI in [facility] (traveled to [facility]), with some persistent mild cough for months, attributed to post nasal drip.     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    Current Medications:    Current Outpatient Medications:     azelastine (OPTIVAR) 0.05 % ophthalmic solution, , Disp: , Rfl:     Calcium-Magnesium-Vitamin D (CALCIUM 1200+D3  "PO), Take by mouth, Disp: , Rfl:     Cholecalciferol (Vitamin D) 50 MCG (2000 UT) tablet, Take 2,000 Units by mouth daily, Disp: , Rfl:     cycloSPORINE (RESTASIS) 0.05 % ophthalmic emulsion, 1 drop daily as needed, Disp: , Rfl:     estradiol (Vagifem) 10 MCG TABS vaginal tablet, Insert 1 tablet (10 mcg total) into the vagina 2 (two) times a week, Disp: 26 tablet, Rfl: 3    levofloxacin (LEVAQUIN) 500 mg tablet, Take 1 tablet (500 mg total) by mouth every 24 hours for 7 days, Disp: 7 tablet, Rfl: 0    Multiple Vitamin (MULTIVITAMINS PO), Take by mouth, Disp: , Rfl:     Historical Information   Past Medical History:   Diagnosis Date    Age-related osteoporosis without current pathological fracture     Anxiety     Depression     Dry eyes     Seasonal allergic rhinitis      Past Surgical History:   Procedure Laterality Date    BREAST BIOPSY Right 2007    core bx benign    BREAST BIOPSY Left 2009    core bx benign    COLONOSCOPY      WISDOM TOOTH EXTRACTION      in her 20's     Social History   Social History     Tobacco Use   Smoking Status Never   Smokeless Tobacco Never       Family History:   Family History   Problem Relation Age of Onset    ALS Mother     Hyperlipidemia Mother     Hypertension Father     Osteoporosis Sister     Osteoporosis Sister     No Known Problems Daughter     No Known Problems Daughter     Diabetes Maternal Grandmother     Heart disease Maternal Grandmother     Stroke Maternal Grandfather     Diabetes Paternal Grandmother     Colon cancer Paternal Grandmother     Heart attack Paternal Grandfather     No Known Problems Maternal Aunt     No Known Problems Maternal Aunt          PhysicalExamination:  Vitals:   /70 (BP Location: Left arm, Patient Position: Sitting, Cuff Size: Standard)   Pulse 77   Temp (!) 96.8 °F (36 °C)   Ht 5' 2.5\" (1.588 m)   Wt 46.7 kg (103 lb)   LMP  (LMP Unknown)   SpO2 99%   BMI 18.54 kg/m²     Appearance -- NAD, speaking full sentences  HEENT -- anicteric " "sclera, clear OP, MMM  Neck -- no JVD  Heart -- RRR, no murmurs  Lungs -- minimal inspiratory squeak in L and R midlung  Abdomen -- soft, NTND, +bs  Extremities -- WWP, no LE edema  Skin -- no rash  Neuro -- A&Ox3, wnl  Psych -- no obvious depression or hallucination        Diagnostic Data:  Labs:  I personally reviewed the most recent laboratory data pertinent to today's visit    Lab Results   Component Value Date    WBC 5.72 01/16/2024    HGB 12.7 01/16/2024    HCT 39.1 01/16/2024    MCV 93 01/16/2024     (L) 01/16/2024     Lab Results   Component Value Date    CALCIUM 9.3 01/15/2024    K 4.0 01/15/2024    CO2 31 01/15/2024     01/15/2024    BUN 18 01/15/2024    CREATININE 0.75 01/15/2024     No results found for: \"IGE\"  Lab Results   Component Value Date    ALT 28 07/27/2023    AST 26 07/27/2023    ALKPHOS 72 07/27/2023       PFT results:  The most recent pulmonary function tests were reviewed.  None    Imaging:  I personally reviewed the images on the PAC system pertinent to today's visit  CT Chest 1/2024: No PE, Findings concerning for multifocal pneumonia (worse in lingula) - atypical organisms such as nontuberculous mycobacterium is included in the differential. Recommend follow-up CT chest without contrast in 8-12 weeks to ensure clearance/resolution after   medical therapy.  No thoracic aortic dissection.    Other studies:  None        Naomi Smith MD  [facility]  "

## 2024-01-22 ENCOUNTER — NURSE TRIAGE (OUTPATIENT)
Age: 64
End: 2024-01-22

## 2024-01-22 NOTE — TELEPHONE ENCOUNTER
----- Message from Chinyere Crystal sent at 1/22/2024  7:33 AM EST -----  Regarding: Coughing up clots  Contact: [phone]  Also I finished the 7 days of levofloxacin yesterday

## 2024-01-22 NOTE — TELEPHONE ENCOUNTER
Follow Up 1/17/24 Appt - Hemoptysis    Pt states completed Levaquin, chest tightness improved while on ABX but returning now, using inhaler.   Coughed up large mucous plug yesterday with blood; today bloody sputum/expectorant see pictures; otherwise nonproductive cough.  Denies fever.  States O2 Sats are good, did not level.      Please advise further treatment.    Pt received summons for Jury Duty, needs to call in tonight.  Requesting letter to be excuse due to Pneumonia.    Reason for Disposition  • Few streaks of blood mixed in with yellow or green sputum (all other triage questions negative)    Answer Assessment - Initial Assessment Questions  1. ONSET:       Dx w/Pneumonia 1/17/24 and tx  2. SEVERITY:       Sent pictures of bloody sputum, hard to produce/bring up  3. SPUTUM:       Large mucous plug yesterday with blood, blood today (see pictures)  4. HEMOPTYSIS:       Yes, see pictures.    5. DIFFICULTY BREATHING:       Denies SOB, stes chest tightness returning since ABX done.  6. FEVER:      Denies  8. LUNG HISTORY:      Denies  10. OTHER SYMPTOMS:         Chest tightness starting again since ABX done    Protocols used: Coughing Up Blood-ADULT-

## 2024-01-23 ENCOUNTER — OFFICE VISIT (OUTPATIENT)
Dept: PULMONOLOGY | Facility: CLINIC | Age: 64
End: 2024-01-23
Payer: COMMERCIAL

## 2024-01-23 VITALS
BODY MASS INDEX: 17.54 KG/M2 | OXYGEN SATURATION: 99 % | HEART RATE: 63 BPM | SYSTOLIC BLOOD PRESSURE: 122 MMHG | WEIGHT: 99 LBS | DIASTOLIC BLOOD PRESSURE: 70 MMHG | HEIGHT: 63 IN

## 2024-01-23 DIAGNOSIS — R05.3 CHRONIC COUGH: ICD-10-CM

## 2024-01-23 DIAGNOSIS — J47.9 BRONCHIECTASIS WITHOUT COMPLICATION (HCC): Primary | ICD-10-CM

## 2024-01-23 DIAGNOSIS — J45.909 MILD ASTHMA WITHOUT COMPLICATION, UNSPECIFIED WHETHER PERSISTENT: ICD-10-CM

## 2024-01-23 PROCEDURE — 99214 OFFICE O/P EST MOD 30 MIN: CPT | Performed by: INTERNAL MEDICINE

## 2024-01-23 RX ORDER — ALBUTEROL SULFATE 90 UG/1
2 AEROSOL, METERED RESPIRATORY (INHALATION) EVERY 6 HOURS PRN
Qty: 18 G | Refills: 2 | Status: SHIPPED | OUTPATIENT
Start: 2024-01-23

## 2024-01-23 NOTE — PROGRESS NOTES
Consultation - Pulmonary Medicine   Chinyere Crystal 63 y.o. female MRN: [id number]    Physician Requesting Consult: Perfecto Petty  Reason for Consult: hemotysis    Chinyere Crystal is a 63 y.o. female hx rhinitis, pulm nodules, PNA, chronic thrombocytopenia, nonsmoker, who presents for evaluation of hemoptysis.    # PNA  # Mild lingula bronchiolectasis  # Hemoptysis  # Chronic Cough  # Mild thrombocytopenia  Non smoker with mild non productive throat clearing since December, initially thought to be post nasal drip. Had 2 episodes waking up with blood in the back of her throat without epistaxis, normal ENT exam, and no clear signs of GI bleed (no hematemesis, hematochezia, abd pain). Second episode in setting of worsening cough and some URI symptoms and CT showing small lingular consolidation and bronchiolectasis. Since starting antibiotics minimal cough and no further episodes of bleeding. Inflammation from PNA + bronchial infection can cause bleeding, especially in setting of bronchiolectasis and thrombocytopenia. Cause of bronchiolectasis likely prior infection, though older thin woman also at risk for MAC and bronchiectasis. Immunoglobulins in 2022 normal, QF gold negative    Minimal blood (old) tinged mucous 1 day after completion of anitbiotics  Cough improving with albuterol and flonase    - follow up CT in 8-12 weeks  --- discussed risks and benefits of mucous clearance therapy, start clearance therapy albuterol followed by saline 3% and atwood cough start with daily  --- ordered nebulizer  - continue flonase (can do with saline spray) for chronic cough  - continue albuterol prn  - recommend obtaining CD of imaging to bring to [place] with her  - ED precautions; if repeat hemoptysis with > 1  teaspoon or repeat episodes, present to ED for urgent imaging and possible IR embolization    Vaccines  Immunization History   Administered Date(s) Administered    COVID-19 MODERNA VACC 0.5 ML IM 01/04/2021, 01/04/2021,  02/01/2021, 02/01/2021, 08/30/2021    COVID-19 PFIZER VACCINE 0.3 ML IM 11/11/2022    COVID-19 Pfizer mRNA vacc PF tess-sucrose 12 yr and older (Comirnaty) 11/10/2023    INFLUENZA 10/25/2017, 10/11/2018, 10/09/2019, 10/12/2020, 10/19/2021, 10/20/2022, 11/07/2023    Influenza, seasonal, injectable 10/25/2017, 10/19/2021    Pneumococcal Conjugate Vaccine 20-valent (Pcv20), Polysace 07/18/2023    Pneumococcal Polysaccharide PPV23 06/30/2020    Tdap 09/09/2019    Zoster 03/11/2022    Zoster Vaccine Recombinant 12/20/2021        I have spent a total time of 35 minutes on 01/23/24 in caring for this patient including Diagnostic results, Prognosis, Risks and benefits of tx options, Instructions for management, Patient and family education, Impressions, Counseling / Coordination of care, Documenting in the medical record, Reviewing / ordering tests, medicine, procedures  , and Obtaining or reviewing history  .   _____________________________________________________________Interval Hx:    Using nasal spray and labuteorl, helpign with the cough    Finished abx, still with very slight blood tinged mucous and small (5mm) old clots (see media). No chest pain.       HPI:    Chinyere Crystal is a 63 y.o. female  hx rhinitis, pulm nodules, PNA, chronic thrombocytopenia, who presents for evaluation of hemoptysis.    Nonsmoker  Pt had a URI in [facility] (traveled to [place]), with some persistent mild cough for months, attributed to post nasal drip.     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   HENT:  Negative for ear pain, postnasal drip, rhinorrhea, sneezing, sore throat and trouble swallowing.    Respiratory:  Positive for cough.    Cardiovascular:  Positive for chest pain.   Musculoskeletal:  Negative for myalgias.   Neurological:  Negative for headaches.     Aside from what is mentioned in the HPI, the review of systems otherwise negative.    Current Medications:    Current Outpatient Medications:     albuterol (ProAir HFA) 90 mcg/act inhaler, Inhale 2 puffs every 6 (six) hours as needed for wheezing, Disp: 8.5 g, Rfl: 2    azelastine (OPTIVAR) 0.05 % ophthalmic solution, , Disp: , Rfl:     Calcium-Magnesium-Vitamin D (CALCIUM 1200+D3 PO), Take by mouth, Disp: , Rfl:     cycloSPORINE (RESTASIS) 0.05 % ophthalmic emulsion, 1 drop daily as needed, Disp: , Rfl:     estradiol (Vagifem) 10 MCG TABS vaginal tablet, Insert 1 tablet (10 mcg total) into the vagina 2 (two) times a week, Disp: 26 tablet, Rfl: 3    fluticasone (FLONASE) 50 mcg/act nasal spray, 1 spray into each nostril daily, Disp: 11.1 mL, Rfl: 2    Multiple Vitamin (MULTIVITAMINS PO), Take by mouth, Disp: , Rfl:     Cholecalciferol (Vitamin D) 50 MCG (2000 UT) tablet, Take 2,000 Units by mouth daily (Patient not taking: Reported on 1/23/2024), Disp: , Rfl:     Historical Information   Past Medical History:   Diagnosis Date    Age-related osteoporosis without current pathological fracture     Anxiety     Depression     Dry eyes     Seasonal allergic rhinitis      Past Surgical History:   Procedure Laterality Date    BREAST BIOPSY Right 2007    core bx benign    BREAST BIOPSY Left 2009    core bx benign    COLONOSCOPY      WISDOM TOOTH EXTRACTION      in her 20's     Social History   Social History     Tobacco Use   Smoking Status Never   Smokeless Tobacco Never       Family History:   Family History   Problem Relation Age of Onset    ALS Mother     Hyperlipidemia Mother      "Hypertension Father     Osteoporosis Sister     Osteoporosis Sister     No Known Problems Daughter     No Known Problems Daughter     Diabetes Maternal Grandmother     Heart disease Maternal Grandmother     Stroke Maternal Grandfather     Diabetes Paternal Grandmother     Colon cancer Paternal Grandmother     Heart attack Paternal Grandfather     No Known Problems Maternal Aunt     No Known Problems Maternal Aunt          PhysicalExamination:  Vitals:   /70 (BP Location: Right arm, Patient Position: Sitting, Cuff Size: Standard)   Pulse 63   Ht 5' 2.5\" (1.588 m)   Wt 44.9 kg (99 lb)   LMP  (LMP Unknown)   SpO2 99%   BMI 17.82 kg/m²     Appearance -- NAD, speaking full sentences  HEENT -- anicteric sclera, clear OP, MMM  Neck -- no JVD  Heart -- RRR, no murmurs  Lungs -- CTAB no rhonchi or wheezing  Abdomen -- soft, NTND, +bs  Extremities -- WWP, no LE edema  Skin -- no rash  Neuro -- A&Ox3, wnl  Psych -- no obvious depression or hallucination        Diagnostic Data:  Labs:  I personally reviewed the most recent laboratory data pertinent to today's visit    Lab Results   Component Value Date    WBC 5.72 01/16/2024    HGB 12.7 01/16/2024    HCT 39.1 01/16/2024    MCV 93 01/16/2024     (L) 01/16/2024     Lab Results   Component Value Date    CALCIUM 9.3 01/15/2024    K 4.0 01/15/2024    CO2 31 01/15/2024     01/15/2024    BUN 18 01/15/2024    CREATININE 0.75 01/15/2024     No results found for: \"IGE\"  Lab Results   Component Value Date    ALT 28 07/27/2023    AST 26 07/27/2023    ALKPHOS 72 07/27/2023       PFT results:  The most recent pulmonary function tests were reviewed.  None    Imaging:  I personally reviewed the images on the PAC system pertinent to today's visit  CT Chest 1/2024: No PE, Findings concerning for multifocal pneumonia (worse in lingula) - atypical organisms such as nontuberculous mycobacterium is included in the differential. Recommend follow-up CT chest without contrast " in 8-12 weeks to ensure clearance/resolution after   medical therapy.  No thoracic aortic dissection.    Other studies:  None        Naomi Smith MD  [facility]

## 2024-01-31 DIAGNOSIS — N95.2 ATROPHIC VAGINITIS: ICD-10-CM

## 2024-02-01 RX ORDER — ESTRADIOL 10 UG/1
1 INSERT VAGINAL 2 TIMES WEEKLY
Qty: 26 TABLET | Refills: 0 | Status: SHIPPED | OUTPATIENT
Start: 2024-02-01

## 2024-02-02 RX ORDER — SODIUM CHLORIDE 9 MG/ML
125 INJECTION, SOLUTION INTRAVENOUS CONTINUOUS
Status: CANCELLED | OUTPATIENT
Start: 2024-02-02

## 2024-02-05 ENCOUNTER — HOSPITAL ENCOUNTER (OUTPATIENT)
Dept: GASTROENTEROLOGY | Facility: HOSPITAL | Age: 64
Setting detail: OUTPATIENT SURGERY
Discharge: HOME | End: 2024-02-05
Attending: COLON & RECTAL SURGERY
Payer: COMMERCIAL

## 2024-02-05 ENCOUNTER — ANESTHESIA EVENT (OUTPATIENT)
Dept: GASTROENTEROLOGY | Facility: HOSPITAL | Age: 64
End: 2024-02-05

## 2024-02-05 ENCOUNTER — ANESTHESIA (OUTPATIENT)
Dept: GASTROENTEROLOGY | Facility: HOSPITAL | Age: 64
End: 2024-02-05

## 2024-02-05 VITALS
HEART RATE: 72 BPM | SYSTOLIC BLOOD PRESSURE: 102 MMHG | RESPIRATION RATE: 16 BRPM | DIASTOLIC BLOOD PRESSURE: 58 MMHG | OXYGEN SATURATION: 100 %

## 2024-02-05 DIAGNOSIS — Z86.010 PERSONAL HISTORY OF COLONIC POLYPS: ICD-10-CM

## 2024-02-05 DIAGNOSIS — Z12.11 ENCOUNTER FOR SCREENING FOR MALIGNANT NEOPLASM OF COLON: ICD-10-CM

## 2024-02-05 RX ORDER — PROPOFOL 10 MG/ML
INJECTION, EMULSION INTRAVENOUS AS NEEDED
Status: DISCONTINUED | OUTPATIENT
Start: 2024-02-05 | End: 2024-02-05

## 2024-02-05 RX ORDER — SODIUM CHLORIDE 9 MG/ML
125 INJECTION, SOLUTION INTRAVENOUS CONTINUOUS
Status: DISCONTINUED | OUTPATIENT
Start: 2024-02-05 | End: 2024-02-09 | Stop reason: HOSPADM

## 2024-02-05 RX ADMIN — PROPOFOL 40 MG: 10 INJECTION, EMULSION INTRAVENOUS at 09:08

## 2024-02-05 RX ADMIN — SODIUM CHLORIDE 125 ML/HR: 0.9 INJECTION, SOLUTION INTRAVENOUS at 08:23

## 2024-02-05 RX ADMIN — PROPOFOL 120 MG: 10 INJECTION, EMULSION INTRAVENOUS at 09:03

## 2024-02-05 RX ADMIN — PROPOFOL 40 MG: 10 INJECTION, EMULSION INTRAVENOUS at 09:14

## 2024-02-05 RX ADMIN — PROPOFOL 20 MG: 10 INJECTION, EMULSION INTRAVENOUS at 09:18

## 2024-02-05 NOTE — INTERVAL H&P NOTE
H&P reviewed. After examining the patient I find no changes in the patients condition since the H&P had been written.    Vitals:    02/05/24 0819   BP: 132/61   Pulse: 69   Resp: 16   SpO2: 99%

## 2024-02-05 NOTE — ANESTHESIA PREPROCEDURE EVALUATION
Procedure:  COLONOSCOPY    Relevant Problems   HEMATOLOGY   (+) Thrombocytopenia (HCC)      MUSCULOSKELETAL   (+) Primary generalized (osteo)arthritis      PULMONARY   (+) Mild asthma without complication, unspecified whether persistent   (+) Multifocal pneumonia      Respiratory   (+) Bronchiectasis without complication (HCC)      Other   (+) Adenomatous polyp   (+) Chronic cough   (+) Current use of estrogen therapy   (+) Hemoptysis        Physical Exam    Airway    Mallampati score: I         Dental   No notable dental hx     Cardiovascular  Rhythm: regular, Rate: normal    Pulmonary      Other Findings  post-pubertal.      Anesthesia Plan  ASA Score- 2     Anesthesia Type- IV sedation with anesthesia with ASA Monitors.         Additional Monitors:     Airway Plan:     Comment: Last plt 112.       Plan Factors-Exercise tolerance (METS): >4 METS.    Chart reviewed.   Existing labs reviewed.     Patient is not a current smoker.      Obstructive sleep apnea risk education given perioperatively.        Induction- intravenous.    Postoperative Plan-     Informed Consent- Anesthetic plan and risks discussed with patient.

## 2024-02-05 NOTE — DISCHARGE INSTRUCTIONS
"[facility]  [place] [address]  [place] [address]  Phone: [phone]    DISCHARGE INSTRUCTIONS:    1.  _x__ Complete Exam - Normal    2.  ___ Exam normal, but entire colon not seen. We will discuss this with you.    3.  ___ Polyp(s) removed by \"burning\" - NO pathology report will follow    4.  ___ Polyp(s) removed by excision. Pathology report will be available in 4-5 days   Someone from our office will call you with results.    5.  ___ Exam prompted biopsies. Pathology report will be available in 4-5 days   Someone from our office will call you with results.    6.  ___ Exam demonstrated findings that need treatment. Prescriptions will be   Given to you. Return to our office in ____ weeks. Please call for appt.    7.  ___ Original office visit or colonoscopy findings necessitate an office visit.   Please call to set up a new appointment    8.  ___ Medication  __________________________________________        WHEN LEAVING THE HOSPITAL:    - Go straight home and rest today    - No driving or drinking alcohol for 24 hours    - Resume regular diet and medications unless otherwise instructed. Coumadin and Plavix are blood thinners. You can resume these medications on __________.    IF YOU ARE HAVING ANY FEVER, BLEEDING OR PERSISTENT PAIN IN THE ABDOMEN, PLEASE CALL OUR OFFICE ANY DAY OR TIME  [phone]  "

## 2024-02-05 NOTE — ANESTHESIA POSTPROCEDURE EVALUATION
Post-Op Assessment Note    CV Status:  Stable    Pain management: adequate       Mental Status:  Awake   Hydration Status:  Stable   PONV Controlled:  Controlled   Airway Patency:  Patent     Post Op Vitals Reviewed: Yes    No anethesia notable event occurred.    Staff: Anesthesiologist               /58 (02/05/24 0941)    Temp      Pulse 72 (02/05/24 0941)   Resp 16 (02/05/24 0941)    SpO2 100 % (02/05/24 0941)

## 2024-02-09 DIAGNOSIS — R05.3 CHRONIC COUGH: ICD-10-CM

## 2024-02-12 RX ORDER — ALBUTEROL SULFATE 90 UG/1
2 AEROSOL, METERED RESPIRATORY (INHALATION) EVERY 6 HOURS PRN
Qty: 18 G | Refills: 1 | Status: SHIPPED | OUTPATIENT
Start: 2024-02-12

## 2024-02-21 PROBLEM — Z00.00 WELCOME TO MEDICARE PREVENTIVE VISIT: Status: RESOLVED | Noted: 2024-01-16 | Resolved: 2024-02-21

## 2024-02-21 PROBLEM — J18.9 MULTIFOCAL PNEUMONIA: Status: RESOLVED | Noted: 2024-01-16 | Resolved: 2024-02-21

## 2024-04-08 ENCOUNTER — TELEPHONE (OUTPATIENT)
Age: 64
End: 2024-04-08

## 2024-04-22 DIAGNOSIS — N95.2 ATROPHIC VAGINITIS: ICD-10-CM

## 2024-04-22 RX ORDER — ESTRADIOL 10 UG/1
TABLET VAGINAL
Qty: 24 TABLET | Refills: 1 | Status: SHIPPED | OUTPATIENT
Start: 2024-04-22

## 2024-04-29 ENCOUNTER — TELEPHONE (OUTPATIENT)
Dept: RHEUMATOLOGY | Facility: CLINIC | Age: 64
End: 2024-04-29

## 2024-07-10 ENCOUNTER — OFFICE VISIT (OUTPATIENT)
Dept: FAMILY MEDICINE CLINIC | Facility: CLINIC | Age: 64
End: 2024-07-10
Payer: COMMERCIAL

## 2024-07-10 VITALS
DIASTOLIC BLOOD PRESSURE: 70 MMHG | OXYGEN SATURATION: 100 % | BODY MASS INDEX: 16.66 KG/M2 | WEIGHT: 94 LBS | HEART RATE: 65 BPM | HEIGHT: 63 IN | SYSTOLIC BLOOD PRESSURE: 112 MMHG

## 2024-07-10 DIAGNOSIS — R05.3 CHRONIC COUGH: ICD-10-CM

## 2024-07-10 DIAGNOSIS — E55.9 VITAMIN D DEFICIENCY: ICD-10-CM

## 2024-07-10 DIAGNOSIS — M81.0 SENILE OSTEOPOROSIS: ICD-10-CM

## 2024-07-10 DIAGNOSIS — E78.00 PURE HYPERCHOLESTEROLEMIA: ICD-10-CM

## 2024-07-10 DIAGNOSIS — R91.8 PULMONARY NODULES: ICD-10-CM

## 2024-07-10 DIAGNOSIS — F43.22 ADJUSTMENT DISORDER WITH ANXIOUS MOOD: Primary | ICD-10-CM

## 2024-07-10 DIAGNOSIS — D69.6 THROMBOCYTOPENIA (HCC): ICD-10-CM

## 2024-07-10 DIAGNOSIS — R63.4 WEIGHT LOSS: ICD-10-CM

## 2024-07-10 DIAGNOSIS — E53.8 VITAMIN B12 DEFICIENCY: ICD-10-CM

## 2024-07-10 PROCEDURE — G2211 COMPLEX E/M VISIT ADD ON: HCPCS | Performed by: FAMILY MEDICINE

## 2024-07-10 PROCEDURE — 99214 OFFICE O/P EST MOD 30 MIN: CPT | Performed by: FAMILY MEDICINE

## 2024-07-10 RX ORDER — MIRTAZAPINE 15 MG/1
15 TABLET, FILM COATED ORAL
COMMUNITY
Start: 2024-07-07

## 2024-07-10 NOTE — PROGRESS NOTES
Ambulatory Visit  Name: Chinyere Crystal      : 1960      MRN: [id number]  Encounter Provider: Zoe Sims MD  Encounter Date: 7/10/2024   Encounter department: 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 She lives for several months a year in [place] where her children and grandchildren live.    She has had a recurrence of upper respiratory infection with productive cough (no further hemoptysis.) She saw Pulmonary in [facility] and had a bronchoscopy, the results are currently pending. She has encountered some social and family stressors with the back and forth travel, and transitioning to new life in a new country. 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 Has had some recent difficulty sleeping which she attributes to recent stress.  She has recently consulted a Psychiatrist and started Remeron 15mg at bedtime which is helping.  She will also be initiating psychotherapy.    Other than that she is feeling well. She denies CP, SOB, nausea, vomiting, muscle aches. She has had a few pound weight loss. She has a remote hx of depression also exacerbated by stressors, treated successfully with sertraline.      1. Adjustment disorder with anxious mood  Assessment & Plan:  We talked at length about her social stressors and about the difficulties of transitioning to a new country and a new culture and giving up her work which she loved.She certainly feels pulled between two scenarios and is currently working through this. She will be meeting with a therapist in the near future. She will also continue on remeron for now, which is helping with insomnia. F/U in 4-6 weeks, and she knows she can reach out to me at any time sooner if needed. We discussed restarting sertraline, but she prefers to hold off at this time.  2. Chronic  cough  Assessment & Plan:  S/P bronchoscopy in [place], results pending.  Consider allergy testing if w/u remains negative. She is exposed to a lot of dust, and may ultimately benefit from inhaled steroid.  Continue flonase.  3. Weight loss  Assessment & Plan:  Likley related to multiple stressors, dysthymia, adjustment reaction. Try to focus on protein and vegetables. Try not to skip meals.  Monitor.  Check TFTs.  Orders:  -     T4; Future  -     TSH, 3rd generation; Future  4. Senile osteoporosis  Assessment & Plan:  Prolia is on hold for now.  Continue Ca++ in diet and vit d and exercise.  Orders:  -     CBC and differential; Future  -     Comprehensive metabolic panel; Future  -     Lipid panel; Future  -     T4; Future  -     TSH, 3rd generation; Future  5. Thrombocytopenia (HCC)  Assessment & Plan:  Stable. Repeat for stability.  Orders:  -     CBC and differential; Future  -     Comprehensive metabolic panel; Future  -     Lipid panel; Future  -     T4; Future  -     TSH, 3rd generation; Future  6. Vitamin D deficiency  Assessment & Plan:  Check Vit D level.  Continue OTC supplements.  Orders:  -     CBC and differential; Future  -     Comprehensive metabolic panel; Future  -     Lipid panel; Future  -     T4; Future  -     TSH, 3rd generation; Future  -     Vitamin D 25 hydroxy; Future  7. Pulmonary nodules  Assessment & Plan:  F/U with Pulmonary. Do not seem suspicious.  Orders:  -     CBC and differential; Future  -     Comprehensive metabolic panel; Future  -     Lipid panel; Future  -     T4; Future  -     TSH, 3rd generation; Future  8. Pure hypercholesterolemia  -     Lipid panel; Future  9. Vitamin B12 deficiency  -     Vitamin B12; Future      Labs and  f/u 4-6 weeks.       History of Present Illness     In April she had a bronchitis and was treated in [place] and had bronchoscopy and biopsy in [place]. Not everything is back yet but had bronchial washings to r/o inflammatory , fungal, etc  Wonders  "whether an environmental trigger  Tried nasacort, allegra  She has lost a dew pounds - more active always walking, anxious  Early morning awakening in panic  Started remeron 15mg HS  hX of depression when retired, treated with zoloft; trazodone did not help with sleep  Met with psychologist and will start therapy    Prolia on hold        Review of Systems   Constitutional:         See HPI   HENT:  Negative for congestion, ear pain, mouth sores, sinus pressure and trouble swallowing.    Eyes:  Negative for discharge, redness and itching.   Respiratory:  Negative for apnea, cough, chest tightness, shortness of breath, wheezing and stridor.         See HPI   Cardiovascular:  Negative for chest pain, palpitations and leg swelling.   Gastrointestinal:  Negative for abdominal distention, abdominal pain, blood in stool, constipation, diarrhea, nausea and vomiting.   Endocrine: Negative for cold intolerance and heat intolerance.   Genitourinary:  Negative for difficulty urinating, dysuria, flank pain and urgency.   Musculoskeletal:  Negative for arthralgias and myalgias.   Skin:  Negative for rash.   Neurological:  Negative for dizziness, seizures, syncope, speech difficulty, weakness, light-headedness, numbness and headaches.   Hematological:  Negative for adenopathy.   Psychiatric/Behavioral:  Negative for agitation, behavioral problems, confusion and sleep disturbance. The patient is nervous/anxious.         See HPI       Objective     /70   Pulse 65   Ht 5' 2.5\" (1.588 m)   Wt 42.6 kg (94 lb)   LMP  (LMP Unknown)   SpO2 100%   BMI 16.92 kg/m²     Physical Exam  Vitals and nursing note reviewed.   Constitutional:       General: She is not in acute distress.     Appearance: Normal appearance. She is well-developed. She is not ill-appearing, toxic-appearing or diaphoretic.   Eyes:      General: No scleral icterus.  Neck:      Vascular: No carotid bruit.   Cardiovascular:      Rate and Rhythm: Normal rate and " regular rhythm.      Heart sounds: Normal heart sounds. No murmur heard.     No friction rub. No gallop.   Pulmonary:      Effort: Pulmonary effort is normal. No respiratory distress.      Breath sounds: Normal breath sounds. No stridor. No wheezing, rhonchi or rales.   Chest:      Chest wall: No tenderness.   Abdominal:      General: Abdomen is flat. Bowel sounds are normal. There is no distension.      Palpations: Abdomen is soft. There is no mass.      Tenderness: There is no abdominal tenderness. There is no guarding or rebound.      Hernia: No hernia is present.   Musculoskeletal:         General: Normal range of motion.      Cervical back: Normal range of motion and neck supple. No rigidity or tenderness.      Right lower leg: No edema.      Left lower leg: No edema.   Lymphadenopathy:      Cervical: No cervical adenopathy.   Skin:     Coloration: Skin is not jaundiced.      Findings: No rash.   Neurological:      General: No focal deficit present.      Mental Status: She is alert and oriented to person, place, and time.   Psychiatric:         Mood and Affect: Mood normal.         Behavior: Behavior normal.         Thought Content: Thought content normal.         Judgment: Judgment normal.       Administrative Statements

## 2024-07-13 PROBLEM — F43.22 ADJUSTMENT DISORDER WITH ANXIOUS MOOD: Status: ACTIVE | Noted: 2024-07-13

## 2024-07-13 PROBLEM — R63.4 WEIGHT LOSS: Status: ACTIVE | Noted: 2024-07-13

## 2024-07-13 NOTE — ASSESSMENT & PLAN NOTE
Likley related to multiple stressors, dysthymia, adjustment reaction. Try to focus on protein and vegetables. Try not to skip meals.  Monitor.  Check TFTs.

## 2024-07-13 NOTE — ASSESSMENT & PLAN NOTE
We talked at length about her social stressors and about the difficulties of transitioning to a new country and a new culture and giving up her work which she loved.She certainly feels pulled between two scenarios and is currently working through this. She will be meeting with a therapist in the near future. She will also continue on remeron for now, which is helping with insomnia. F/U in 4-6 weeks, and she knows she can reach out to me at any time sooner if needed. We discussed restarting sertraline, but she prefers to hold off at this time.

## 2024-07-13 NOTE — ASSESSMENT & PLAN NOTE
S/P bronchoscopy in [place], results pending.  Consider allergy testing if w/u remains negative. She is exposed to a lot of dust, and may ultimately benefit from inhaled steroid.  Continue flonase.

## 2024-07-15 ENCOUNTER — APPOINTMENT (OUTPATIENT)
Dept: LAB | Facility: HOSPITAL | Age: 64
End: 2024-07-15
Payer: COMMERCIAL

## 2024-07-15 DIAGNOSIS — M81.0 SENILE OSTEOPOROSIS: ICD-10-CM

## 2024-07-15 DIAGNOSIS — R91.8 PULMONARY NODULES: ICD-10-CM

## 2024-07-15 DIAGNOSIS — E78.00 PURE HYPERCHOLESTEROLEMIA: ICD-10-CM

## 2024-07-15 DIAGNOSIS — D69.6 THROMBOCYTOPENIA (HCC): ICD-10-CM

## 2024-07-15 DIAGNOSIS — R63.4 WEIGHT LOSS: ICD-10-CM

## 2024-07-15 DIAGNOSIS — E53.8 VITAMIN B12 DEFICIENCY: ICD-10-CM

## 2024-07-15 DIAGNOSIS — E55.9 VITAMIN D DEFICIENCY: ICD-10-CM

## 2024-07-15 LAB
25(OH)D3 SERPL-MCNC: 40 NG/ML (ref 30–100)
ALBUMIN SERPL BCG-MCNC: 4.4 G/DL (ref 3.5–5)
ALP SERPL-CCNC: 56 U/L (ref 34–104)
ALT SERPL W P-5'-P-CCNC: 20 U/L (ref 7–52)
ANION GAP SERPL CALCULATED.3IONS-SCNC: 4 MMOL/L (ref 4–13)
AST SERPL W P-5'-P-CCNC: 26 U/L (ref 13–39)
BASOPHILS # BLD AUTO: 0.03 THOUSANDS/ÂΜL (ref 0–0.1)
BASOPHILS NFR BLD AUTO: 1 % (ref 0–1)
BILIRUB SERPL-MCNC: 0.6 MG/DL (ref 0.2–1)
BUN SERPL-MCNC: 19 MG/DL (ref 5–25)
CALCIUM SERPL-MCNC: 9.5 MG/DL (ref 8.4–10.2)
CHLORIDE SERPL-SCNC: 102 MMOL/L (ref 96–108)
CHOLEST SERPL-MCNC: 196 MG/DL
CO2 SERPL-SCNC: 30 MMOL/L (ref 21–32)
CREAT SERPL-MCNC: 0.86 MG/DL (ref 0.6–1.3)
EOSINOPHIL # BLD AUTO: 0.02 THOUSAND/ÂΜL (ref 0–0.61)
EOSINOPHIL NFR BLD AUTO: 0 % (ref 0–6)
ERYTHROCYTE [DISTWIDTH] IN BLOOD BY AUTOMATED COUNT: 13.9 % (ref 11.6–15.1)
GFR SERPL CREATININE-BSD FRML MDRD: 70 ML/MIN/1.73SQ M
GLUCOSE P FAST SERPL-MCNC: 106 MG/DL (ref 65–99)
HCT VFR BLD AUTO: 40.1 % (ref 34.8–46.1)
HDLC SERPL-MCNC: 80 MG/DL
HGB BLD-MCNC: 13.1 G/DL (ref 11.5–15.4)
IMM GRANULOCYTES # BLD AUTO: 0.03 THOUSAND/UL (ref 0–0.2)
IMM GRANULOCYTES NFR BLD AUTO: 1 % (ref 0–2)
LDLC SERPL CALC-MCNC: 95 MG/DL (ref 0–100)
LYMPHOCYTES # BLD AUTO: 0.84 THOUSANDS/ÂΜL (ref 0.6–4.47)
LYMPHOCYTES NFR BLD AUTO: 16 % (ref 14–44)
MCH RBC QN AUTO: 30 PG (ref 26.8–34.3)
MCHC RBC AUTO-ENTMCNC: 32.7 G/DL (ref 31.4–37.4)
MCV RBC AUTO: 92 FL (ref 82–98)
MONOCYTES # BLD AUTO: 0.48 THOUSAND/ÂΜL (ref 0.17–1.22)
MONOCYTES NFR BLD AUTO: 9 % (ref 4–12)
NEUTROPHILS # BLD AUTO: 3.76 THOUSANDS/ÂΜL (ref 1.85–7.62)
NEUTS SEG NFR BLD AUTO: 73 % (ref 43–75)
NONHDLC SERPL-MCNC: 116 MG/DL
NRBC BLD AUTO-RTO: 0 /100 WBCS
PLATELET # BLD AUTO: 152 THOUSANDS/UL (ref 149–390)
PMV BLD AUTO: 11.7 FL (ref 8.9–12.7)
POTASSIUM SERPL-SCNC: 3.7 MMOL/L (ref 3.5–5.3)
PROT SERPL-MCNC: 6.6 G/DL (ref 6.4–8.4)
RBC # BLD AUTO: 4.37 MILLION/UL (ref 3.81–5.12)
SODIUM SERPL-SCNC: 136 MMOL/L (ref 135–147)
T4 SERPL-MCNC: 7.5 UG/DL (ref 6.09–12.23)
TRIGL SERPL-MCNC: 104 MG/DL
TSH SERPL DL<=0.05 MIU/L-ACNC: 3.11 UIU/ML (ref 0.45–4.5)
VIT B12 SERPL-MCNC: 761 PG/ML (ref 180–914)
WBC # BLD AUTO: 5.16 THOUSAND/UL (ref 4.31–10.16)

## 2024-07-15 PROCEDURE — 80053 COMPREHEN METABOLIC PANEL: CPT

## 2024-07-15 PROCEDURE — 84436 ASSAY OF TOTAL THYROXINE: CPT

## 2024-07-15 PROCEDURE — 84443 ASSAY THYROID STIM HORMONE: CPT

## 2024-07-15 PROCEDURE — 85025 COMPLETE CBC W/AUTO DIFF WBC: CPT

## 2024-07-15 PROCEDURE — 82607 VITAMIN B-12: CPT

## 2024-07-15 PROCEDURE — 82306 VITAMIN D 25 HYDROXY: CPT

## 2024-07-15 PROCEDURE — 36415 COLL VENOUS BLD VENIPUNCTURE: CPT

## 2024-07-15 PROCEDURE — 80061 LIPID PANEL: CPT

## 2024-08-14 ENCOUNTER — RA CDI HCC (OUTPATIENT)
Dept: OTHER | Facility: HOSPITAL | Age: 64
End: 2024-08-14

## 2024-08-14 PROBLEM — Z12.31 ENCOUNTER FOR SCREENING MAMMOGRAM FOR BREAST CANCER: Status: RESOLVED | Noted: 2023-08-16 | Resolved: 2024-08-14

## 2024-08-18 PROBLEM — Z01.419 ENCOUNTER FOR ANNUAL ROUTINE GYNECOLOGICAL EXAMINATION: Status: ACTIVE | Noted: 2024-08-18

## 2024-08-18 PROBLEM — Z12.31 ENCOUNTER FOR SCREENING MAMMOGRAM FOR BREAST CANCER: Status: ACTIVE | Noted: 2024-08-18

## 2024-08-18 NOTE — PROGRESS NOTES
Assessment/Plan:  Normal gynecological exam  CoTesting '20- ASCUS/HPV neg; repeat Pap alone '21- ASCUS. Neg Co-Testing '22 - repeat Pap alone '25  Osteoporosis- Evenity 11/22 through rheumatologist ; poorly tolerated Boniva in 2012- Vaginal Dryness- Vagifem 1/wk. Was to go back to twice a week if this Pap smear '22 returned ASCUS.  Most likely would decline a colposcopy, would have repeated the Pap smear in 6 month after using Vagifem 2/wk  Return to office in 1 year  Annual 3-D mammography  Dense Breasts - ABUS explained, ordered. Had been going for Mammo q 2yrs.  Nl ABUS, Abn L M 6/23 - nl Dx'ic - return to screening, no longer dense  Exercise 3 days a week- she does 5  Calcium 1200 mg daily with vitamin D  Colonoscopy - 2/24      Depression screen: Neg        Diagnoses and all orders for this visit:    Encounter for annual routine gynecological examination    Encounter for screening mammogram for breast cancer  -     Mammo screening bilateral w 3d & cad; Future    Atrophic vaginitis  -     estradiol (Yuvafem) 10 MCG TABS vaginal tablet; Insert 1 tablet (10 mcg total) into the vagina 2 (two) times a week    Current use of estrogen therapy              Subjective:        Patient ID: Chinyere Crystal is a 64 y.o. female.    Nohelia returns for an annual visit.  She has no gynecological complaints.  She denies any vaginal bleeding.  She is compliant with Vagifem once a week.  They remain sexually active.  She retired last December and is struggling.  They just returned from [place] in July.  Both daughters love it there and so does Jose R.  She does not.  She is bored when the grandchildren are in school.  This has provoked anxiety and insomnia.  She is seeing a therapist.  She has a prescription for Remeron which she has not used yet.    In November and January she experienced hemoptysis.  A CAT scan revealed an atypical pneumonia for which she was treated.  She eventually had a bronchoscopy which was negative.    She  finished her course of Evenity.  Dr. Chinchilla did want her to be on additional therapy following its completion but she declined.        The following portions of the patient's history were reviewed and updated as appropriate: She  has a past medical history of Age-related osteoporosis without current pathological fracture, Anxiety, Colon polyp, Depression, Dry eyes, Encounter for screening mammogram for breast cancer (08/16/2023), Osteoporosis (2023), Pneumonia, and Seasonal allergic rhinitis.  Patient Active Problem List    Diagnosis Date Noted    Encounter for annual routine gynecological examination 08/18/2024    Encounter for screening mammogram for breast cancer 08/18/2024    Adjustment disorder with anxious mood 07/13/2024    Weight loss 07/13/2024    Chronic cough 01/23/2024    Bronchiectasis without complication (HCC) 01/23/2024    Mild asthma without complication, unspecified whether persistent 01/23/2024    Hemoptysis 01/16/2024    Pulmonary nodules 01/16/2024    Primary generalized (osteo)arthritis 07/12/2023    Chronic leukopenia 07/12/2023    Senile osteoporosis 08/01/2022    ASCUS of cervix with negative high risk HPV 07/23/2022    Dense breast tissue on mammogram 07/23/2022    Current use of estrogen therapy 07/23/2022    Adenomatous polyp 07/09/2022    Vitamin D deficiency 07/05/2021    Thrombocytopenia (HCC) 07/05/2021    Allergic rhinitis 02/05/2019    Dry eye syndrome of both eyes 02/05/2019    Atrophic vaginitis 02/05/2019   PMH:  G3, P2; SAVD x 2, '88, 12/90, Spont Ab '89  Vulvitis 1/10- Lidex cream  R Breast Bx '99  L Stereotactic Bx- 1/09  Dry Eyes '09  Lyme's Disease 8/12  Osteoporosis- Boniva for 1 yr, d/c'd due to SE's- flu-like sx's '11. Declines BMD- would not treat.   Dyspareunia/Vaginal Dryness- '14 Vagifem SE's '15, trial of Estrace '16, back to Vagifem 1/wk       Reactive depression 12/17- senior care/'s illness.      Pneumonia RLL 1/19      Bronchitis 2/20      Pneumonia 3/20       Covid ,   She  has a past surgical history that includes [place] tooth extraction; Breast biopsy (Right, ); Breast biopsy (Left, ); and Colonoscopy.  Her family history includes ALS in her mother; Colon cancer in her paternal grandmother; Diabetes in her maternal grandmother and paternal grandmother; Heart attack in her paternal grandfather; Heart disease in her maternal grandmother; Hyperlipidemia in her mother; Hypertension in her father; No Known Problems in her daughter, daughter, maternal aunt, and maternal aunt; Osteoporosis in her sister and sister; Stroke in her maternal grandfather.  FH:  Mother  age 78 from ALS, she also had hypercholesterolemia  Father had hypercholesterolemia   MGF- CVA  PGF- HD  PGM- Colon Ca  MA- d 89 Parkinson's and dementia '20  She  reports that she has never smoked. She has never used smokeless tobacco. She reports current alcohol use of about 1.0 standard drink of alcohol per week. She reports that she does not use drugs.  SH:  Internist.  Jose R. Yaneth has 3 children , Tory (who I delivered) is , a teacher and debate , and has 2 children. Yaneth and Tory live in [place] about 40 minutes apart.  Jose R survived cancer and has adrenal insufficiency and associated depression.  senior living plans changed.  Nohelia returned to [place] working 3 days a week, then 5d/wk.   Jose R has adjusted to skilled nursing.  They visited the girls spring '22 during the war and in . She retired .       Current Outpatient Medications   Medication Sig Dispense Refill    azelastine (OPTIVAR) 0.05 % ophthalmic solution       Calcium-Magnesium-Vitamin D (CALCIUM 1200+D3 PO) Take by mouth      Cholecalciferol (Vitamin D) 50 MCG ( UT) tablet Take 2,000 Units by mouth daily      cycloSPORINE (RESTASIS) 0.05 % ophthalmic emulsion 1 drop daily as needed      estradiol (Yuvafem) 10 MCG TABS vaginal tablet Insert 1 tablet (10 mcg total) into the vagina 2  (two) times a week 26 tablet 3    fluticasone (FLONASE) 50 mcg/act nasal spray 1 spray into each nostril daily 11.1 mL 2    mirtazapine (REMERON) 15 mg tablet Take 15 mg by mouth daily at bedtime      Multiple Vitamin (MULTIVITAMINS PO) Take by mouth       No current facility-administered medications for this visit.     Current Outpatient Medications on File Prior to Visit   Medication Sig    azelastine (OPTIVAR) 0.05 % ophthalmic solution     Calcium-Magnesium-Vitamin D (CALCIUM 1200+D3 PO) Take by mouth    Cholecalciferol (Vitamin D) 50 MCG (2000 UT) tablet Take 2,000 Units by mouth daily    cycloSPORINE (RESTASIS) 0.05 % ophthalmic emulsion 1 drop daily as needed    fluticasone (FLONASE) 50 mcg/act nasal spray 1 spray into each nostril daily    mirtazapine (REMERON) 15 mg tablet Take 15 mg by mouth daily at bedtime    Multiple Vitamin (MULTIVITAMINS PO) Take by mouth    [DISCONTINUED] estradiol (Yuvafem) 10 MCG TABS vaginal tablet INSERT 1 TABLET INTO THE VAGINA 2 TIMES A WEEK.     No current facility-administered medications on file prior to visit.     She is allergic to sulfa antibiotics..    Review of Systems   Constitutional:  Negative for activity change, appetite change, fatigue and unexpected weight change.   Eyes:  Negative for visual disturbance.   Respiratory:  Negative for cough, chest tightness, shortness of breath and wheezing.    Cardiovascular:  Negative for chest pain, palpitations and leg swelling.        Breast: Patient denies tenderness, nipple discharge, masses, or erythema.   Gastrointestinal:  Negative for abdominal distention, abdominal pain, blood in stool, constipation, diarrhea, nausea and vomiting.   Endocrine: Negative for cold intolerance and heat intolerance.   Genitourinary:  Negative for decreased urine volume, difficulty urinating, dyspareunia, dysuria, frequency, hematuria, menstrual problem, pelvic pain, urgency, vaginal bleeding, vaginal discharge and vaginal pain.         "[facility] [employer] once a week.  They use a lubricant.  Stress incontinence if her bladder is full.  This is rare.   Musculoskeletal:  Negative for arthralgias.   Skin:  Negative for rash.   Neurological:  Negative for weakness, light-headedness, numbness and headaches.   Hematological:  Does not bruise/bleed easily.   Psychiatric/Behavioral:  Negative for agitation, behavioral problems and sleep disturbance. The patient is not nervous/anxious.          Objective:    Vitals:    08/19/24 1455   BP: 100/62   Weight: 42.5 kg (93 lb 12.8 oz)   Height: 5' 2.21\" (1.58 m)            Physical Exam  Vitals and nursing note reviewed. Exam conducted with a chaperone present.   Constitutional:       General: She is not in acute distress.     Appearance: She is well-developed.   HENT:      Head: Normocephalic and atraumatic.   Eyes:      General: No scleral icterus.        Right eye: No discharge.         Left eye: No discharge.      Extraocular Movements: Extraocular movements intact.      Conjunctiva/sclera: Conjunctivae normal.   Neck:      Thyroid: No thyromegaly.      Trachea: No tracheal deviation.   Cardiovascular:      Rate and Rhythm: Normal rate and regular rhythm.      Heart sounds: Normal heart sounds. No murmur heard.  Pulmonary:      Effort: Pulmonary effort is normal. No respiratory distress.      Breath sounds: Normal breath sounds. No wheezing.   Chest:   Breasts:     Breasts are symmetrical.      Right: No inverted nipple, mass, nipple discharge, skin change or tenderness.      Left: No inverted nipple, mass, nipple discharge, skin change or tenderness.   Abdominal:      General: Bowel sounds are normal. There is no distension.      Palpations: Abdomen is soft. There is no mass.      Tenderness: There is no abdominal tenderness. There is no guarding or rebound.   Genitourinary:     General: Normal vulva.      Labia:         Right: No rash, tenderness or lesion.         Left: No rash, tenderness or lesion.       " Vagina: Normal.      Cervix: No cervical motion tenderness or discharge.      Uterus: Not deviated, not enlarged and not tender.       Adnexa:         Right: No mass, tenderness or fullness.          Left: No mass, tenderness or fullness.        Rectum: No external hemorrhoid.      Comments: Urethral meatus within normal limits.  Perineum within normal limits.  Bladder well supported.  Physiologic vaginal atrophy.  Musculoskeletal:         General: No tenderness. Normal range of motion.      Cervical back: Normal range of motion and neck supple.   Lymphadenopathy:      Cervical: No cervical adenopathy.   Skin:     General: Skin is warm and dry.   Neurological:      Mental Status: She is alert and oriented to person, place, and time.   Psychiatric:         Mood and Affect: Mood normal.         Behavior: Behavior normal.         Thought Content: Thought content normal.         Judgment: Judgment normal.

## 2024-08-19 ENCOUNTER — ANNUAL EXAM (OUTPATIENT)
Dept: OBGYN CLINIC | Facility: CLINIC | Age: 64
End: 2024-08-19
Payer: COMMERCIAL

## 2024-08-19 VITALS
HEIGHT: 62 IN | BODY MASS INDEX: 17.26 KG/M2 | DIASTOLIC BLOOD PRESSURE: 62 MMHG | WEIGHT: 93.8 LBS | SYSTOLIC BLOOD PRESSURE: 100 MMHG

## 2024-08-19 DIAGNOSIS — Z01.419 ENCOUNTER FOR ANNUAL ROUTINE GYNECOLOGICAL EXAMINATION: Primary | ICD-10-CM

## 2024-08-19 DIAGNOSIS — Z79.899 CURRENT USE OF ESTROGEN THERAPY: ICD-10-CM

## 2024-08-19 DIAGNOSIS — N95.2 ATROPHIC VAGINITIS: ICD-10-CM

## 2024-08-19 DIAGNOSIS — Z12.31 ENCOUNTER FOR SCREENING MAMMOGRAM FOR BREAST CANCER: ICD-10-CM

## 2024-08-19 PROCEDURE — G0101 CA SCREEN;PELVIC/BREAST EXAM: HCPCS | Performed by: OBSTETRICS & GYNECOLOGY

## 2024-08-19 RX ORDER — ESTRADIOL 10 UG/1
1 INSERT VAGINAL 2 TIMES WEEKLY
Qty: 26 TABLET | Refills: 3 | Status: SHIPPED | OUTPATIENT
Start: 2024-08-19

## 2024-08-21 ENCOUNTER — OFFICE VISIT (OUTPATIENT)
Dept: FAMILY MEDICINE CLINIC | Facility: CLINIC | Age: 64
End: 2024-08-21
Payer: COMMERCIAL

## 2024-08-21 VITALS
WEIGHT: 94 LBS | SYSTOLIC BLOOD PRESSURE: 104 MMHG | BODY MASS INDEX: 17.3 KG/M2 | DIASTOLIC BLOOD PRESSURE: 70 MMHG | HEART RATE: 69 BPM | OXYGEN SATURATION: 98 % | HEIGHT: 62 IN

## 2024-08-21 DIAGNOSIS — R63.4 WEIGHT LOSS: ICD-10-CM

## 2024-08-21 DIAGNOSIS — R73.9 HYPERGLYCEMIA: ICD-10-CM

## 2024-08-21 DIAGNOSIS — F43.22 ADJUSTMENT DISORDER WITH ANXIOUS MOOD: Primary | ICD-10-CM

## 2024-08-21 DIAGNOSIS — R05.3 CHRONIC COUGH: ICD-10-CM

## 2024-08-21 DIAGNOSIS — Z63.6 CAREGIVER BURDEN: ICD-10-CM

## 2024-08-21 LAB — SL AMB POCT HEMOGLOBIN AIC: 5.3 (ref ?–6.5)

## 2024-08-21 PROCEDURE — 83036 HEMOGLOBIN GLYCOSYLATED A1C: CPT | Performed by: FAMILY MEDICINE

## 2024-08-21 PROCEDURE — 99214 OFFICE O/P EST MOD 30 MIN: CPT | Performed by: FAMILY MEDICINE

## 2024-08-21 RX ORDER — MIRTAZAPINE 15 MG/1
15 TABLET, FILM COATED ORAL
Qty: 90 TABLET | Refills: 1 | Status: SHIPPED | OUTPATIENT
Start: 2024-08-21

## 2024-08-21 RX ORDER — LORAZEPAM 0.5 MG/1
TABLET ORAL
Qty: 30 TABLET | Refills: 0 | Status: SHIPPED | OUTPATIENT
Start: 2024-08-21

## 2024-08-21 SDOH — SOCIAL STABILITY - SOCIAL INSECURITY: DEPENDENT RELATIVE NEEDING CARE AT HOME: Z63.6

## 2024-08-21 NOTE — PROGRESS NOTES
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 She will be returning to [place] soon and has anticipatory fear and anxiety about doing so.    1. Adjustment disorder with anxious mood  Assessment & Plan:  Long discussion in regards to upcoming return to [facility] and conflicted feelings about moving there.She has been on remeron but feels it is not really helping. She is feeling more anxious and tearful since last visit.  Pt has been on sertraline in the remote past with success.  We discussed her sx and agree that she would benefit from restarting sertraline at this time. Start at 25mg, then increase to 50mg daily and may increase further to 100mg. She has titrated up in the past. She will f/u with Psychiatry in [facility]. She also has a therapist which she will continue to see virtually.  Continue on remeron 15mg at bedtime, for now, Explained that meds may have additive effect. If she starts to feel better and no longer needs sleep aid she may wean down to 7.5mg and consider stopping.  In addition, she may have a small prescription of lorazepam to take for periods of extreme anxiety, airplane travel.  Orders:  -     mirtazapine (REMERON) 15 mg tablet; Take 1 tablet (15 mg total) by mouth daily at bedtime  -     sertraline (ZOLOFT) 50 mg tablet; Take 1 tablet (50 mg total) by mouth daily  -     LORazepam (Ativan) 0.5 mg tablet; 1 tab po daily as needed for anxiety and panic  2. Chronic cough  Assessment & Plan:  Improved.  Bronchoscopy results were negative for neoplasm or autoimmune disease.  3. Weight loss  Assessment & Plan:  Weight is stable at 94 pounds.   Anxiety contributing.  4. Hyperglycemia  Assessment & Plan:  A1C is normal. Weight normal. Healthy diet  and exercises / active lifestyle.  Orders:  -     POCT hemoglobin A1c  5. Caregiver burden  Comments:  Discussed with patient. Will consider help if necessary but right now feels that she has it under control.    RTO upon return to [place].     History of Present Illness   Chief Complaint   Patient presents with   • Follow-up     Follow up to chronic conditions and review lab results.    • Skin Irritation     2 spots on abdomen. She thinks they may be bug bites but is not sure.         Increased anxiety. Tearful.  Not looking forward to move to [facility] and upcoming return.  Caregiver burden -  has health issues and limitations for past 33 years with gradual slow decline.  No SI, HI.  Sleep is disturbed, despite remeron. She is not sure if it is, in fact, more stimulating.        Review of Systems   Constitutional:         See HPI   HENT:  Negative for congestion, ear pain, mouth sores, sinus pressure and trouble swallowing.    Eyes:  Negative for discharge, redness and itching.   Respiratory:  Negative for apnea, cough, chest tightness, shortness of breath, wheezing and stridor.    Cardiovascular:  Negative for chest pain, palpitations and leg swelling.   Gastrointestinal:  Negative for abdominal distention, abdominal pain, blood in stool, constipation, diarrhea, nausea and vomiting.   Endocrine: Negative for cold intolerance and heat intolerance.   Genitourinary:  Negative for difficulty urinating, dysuria, flank pain and urgency.   Musculoskeletal:  Negative for arthralgias and myalgias.   Skin:  Negative for rash.   Neurological:  Negative for dizziness, seizures, syncope, speech difficulty, weakness, light-headedness, numbness and headaches.        Both arm tingle when she feels anxious. Sx resolve when she feels calmer.   Hematological:  Negative for adenopathy.   Psychiatric/Behavioral:  Positive for dysphoric mood and sleep disturbance. Negative for agitation, behavioral problems, confusion, hallucinations,  "self-injury and suicidal ideas. The patient is nervous/anxious. The patient is not hyperactive.        Objective     /70   Pulse 69   Ht 5' 2.21\" (1.58 m)   Wt 42.6 kg (94 lb)   LMP  (LMP Unknown)   SpO2 98%   BMI 17.08 kg/m²     Physical Exam  Vitals and nursing note reviewed.   Constitutional:       General: She is not in acute distress.     Appearance: Normal appearance. She is well-developed. She is not ill-appearing, toxic-appearing or diaphoretic.   Eyes:      General: No scleral icterus.  Neck:      Vascular: No carotid bruit.   Cardiovascular:      Rate and Rhythm: Normal rate and regular rhythm.      Heart sounds: Normal heart sounds. No murmur heard.     No friction rub. No gallop.   Pulmonary:      Effort: Pulmonary effort is normal. No respiratory distress.      Breath sounds: Normal breath sounds. No stridor. No wheezing, rhonchi or rales.   Chest:      Chest wall: No tenderness.   Musculoskeletal:         General: Normal range of motion.      Cervical back: Normal range of motion and neck supple.      Right lower leg: No edema.      Left lower leg: No edema.   Lymphadenopathy:      Cervical: No cervical adenopathy.   Skin:     Coloration: Skin is not jaundiced.   Neurological:      General: No focal deficit present.      Mental Status: She is alert and oriented to person, place, and time.   Psychiatric:         Behavior: Behavior normal.         Thought Content: Thought content normal.         Judgment: Judgment normal.      Comments: Tearful when speaking about her upcoming return to [place]. As well as ongoing caregiver burden.       Administrative Statements     "

## 2024-08-23 ENCOUNTER — TELEPHONE (OUTPATIENT)
Dept: FAMILY MEDICINE CLINIC | Facility: CLINIC | Age: 64
End: 2024-08-23

## 2024-08-23 PROBLEM — R73.9 HYPERGLYCEMIA: Status: ACTIVE | Noted: 2024-08-23

## 2024-08-24 NOTE — ASSESSMENT & PLAN NOTE
Long discussion in regards to upcoming return to [place] and conflicted feelings about moving there.She has been on remeron but feels it is not really helping. She is feeling more anxious and tearful since last visit.  Pt has been on sertraline in the remote past with success.  We discussed her sx and agree that she would benefit from restarting sertraline at this time. Start at 25mg, then increase to 50mg daily and may increase further to 100mg. She has titrated up in the past. She will f/u with Psychiatry in [place]. She also has a therapist which she will continue to see virtually.  Continue on remeron 15mg at bedtime, for now, Explained that meds may have additive effect. If she starts to feel better and no longer needs sleep aid she may wean down to 7.5mg and consider stopping.  In addition, she may have a small prescription of lorazepam to take for periods of extreme anxiety, airplane travel.

## 2024-08-26 NOTE — TELEPHONE ENCOUNTER
PA for LORazepam (Ativan) 0.5 mg tablet SUBMITTED     via    []CMM-KEY:   [x]Surescripts-Case ID # unavailable  []Faxed to plan   []Other website   []Phone call Case ID #     Office notes sent, clinical questions answered. Awaiting determination    Turnaround time for your insurance to make a decision on your Prior Authorization can take 7-21 business days.

## 2024-08-28 ENCOUNTER — TELEPHONE (OUTPATIENT)
Age: 64
End: 2024-08-28

## 2024-08-28 NOTE — TELEPHONE ENCOUNTER
PA for ATIVAN CANCELLED due to     [x]Approval on file-dates approved May 29, 2024 to August 27, 2025  []Medication already on Formulary  []Brand Name Preferred  []Patient no longer covered by insurance    Patient advised by     [x]My Chart Message  []Phone call    Message sent to office clinical pool   Yes    Scanned into Media  yes

## 2024-09-17 PROBLEM — Z01.419 ENCOUNTER FOR ANNUAL ROUTINE GYNECOLOGICAL EXAMINATION: Status: RESOLVED | Noted: 2024-08-18 | Resolved: 2024-09-17

## 2025-05-22 ENCOUNTER — APPOINTMENT (OUTPATIENT)
Dept: LAB | Facility: MEDICAL CENTER | Age: 65
End: 2025-05-22
Payer: COMMERCIAL

## 2025-05-22 DIAGNOSIS — J47.9 BRONCHIECTASIS WITHOUT ACUTE EXACERBATION (HCC): ICD-10-CM

## 2025-05-22 LAB
ALBUMIN SERPL BCG-MCNC: 4.4 G/DL (ref 3.5–5)
ALP SERPL-CCNC: 81 U/L (ref 34–104)
ALT SERPL W P-5'-P-CCNC: 23 U/L (ref 7–52)
ANION GAP SERPL CALCULATED.3IONS-SCNC: 11 MMOL/L (ref 4–13)
AST SERPL W P-5'-P-CCNC: 27 U/L (ref 13–39)
BILIRUB SERPL-MCNC: 0.5 MG/DL (ref 0.2–1)
BUN SERPL-MCNC: 23 MG/DL (ref 5–25)
CALCIUM SERPL-MCNC: 9.8 MG/DL (ref 8.4–10.2)
CHLORIDE SERPL-SCNC: 101 MMOL/L (ref 96–108)
CO2 SERPL-SCNC: 28 MMOL/L (ref 21–32)
CREAT SERPL-MCNC: 0.73 MG/DL (ref 0.6–1.3)
GFR SERPL CREATININE-BSD FRML MDRD: 85 ML/MIN/1.73SQ M
GLUCOSE P FAST SERPL-MCNC: 93 MG/DL (ref 65–99)
POTASSIUM SERPL-SCNC: 4.2 MMOL/L (ref 3.5–5.3)
PROT SERPL-MCNC: 7.4 G/DL (ref 6.4–8.4)
SODIUM SERPL-SCNC: 140 MMOL/L (ref 135–147)

## 2025-05-22 PROCEDURE — 36415 COLL VENOUS BLD VENIPUNCTURE: CPT

## 2025-05-22 PROCEDURE — 80053 COMPREHEN METABOLIC PANEL: CPT

## 2025-06-02 ENCOUNTER — RA CDI HCC (OUTPATIENT)
Dept: OTHER | Facility: HOSPITAL | Age: 65
End: 2025-06-02

## 2025-06-02 PROBLEM — Z12.31 ENCOUNTER FOR SCREENING MAMMOGRAM FOR BREAST CANCER: Status: RESOLVED | Noted: 2024-08-18 | Resolved: 2025-06-02

## 2025-06-10 PROBLEM — R05.3 CHRONIC COUGH: Status: RESOLVED | Noted: 2024-01-23 | Resolved: 2025-06-10

## 2025-06-10 PROBLEM — R87.610 ASCUS OF CERVIX WITH NEGATIVE HIGH RISK HPV: Status: RESOLVED | Noted: 2022-07-23 | Resolved: 2025-06-10

## 2025-06-10 PROBLEM — F43.22 ADJUSTMENT DISORDER WITH ANXIOUS MOOD: Status: RESOLVED | Noted: 2024-07-13 | Resolved: 2025-06-10

## 2025-06-10 PROBLEM — R04.2 HEMOPTYSIS: Status: RESOLVED | Noted: 2024-01-16 | Resolved: 2025-06-10

## 2025-06-11 ENCOUNTER — OFFICE VISIT (OUTPATIENT)
Dept: FAMILY MEDICINE CLINIC | Facility: CLINIC | Age: 65
End: 2025-06-11
Payer: COMMERCIAL

## 2025-06-11 VITALS
WEIGHT: 95 LBS | BODY MASS INDEX: 17.48 KG/M2 | HEART RATE: 65 BPM | DIASTOLIC BLOOD PRESSURE: 60 MMHG | SYSTOLIC BLOOD PRESSURE: 100 MMHG | OXYGEN SATURATION: 100 % | HEIGHT: 62 IN

## 2025-06-11 DIAGNOSIS — D36.9 ADENOMATOUS POLYP: ICD-10-CM

## 2025-06-11 DIAGNOSIS — M81.0 SENILE OSTEOPOROSIS: ICD-10-CM

## 2025-06-11 DIAGNOSIS — R49.0 HOARSENESS OF VOICE: ICD-10-CM

## 2025-06-11 DIAGNOSIS — R05.3 CHRONIC COUGH: ICD-10-CM

## 2025-06-11 DIAGNOSIS — Z00.00 MEDICARE ANNUAL WELLNESS VISIT, SUBSEQUENT: ICD-10-CM

## 2025-06-11 DIAGNOSIS — J47.9 BRONCHIECTASIS WITHOUT COMPLICATION (HCC): Primary | ICD-10-CM

## 2025-06-11 DIAGNOSIS — N95.2 ATROPHIC VAGINITIS: ICD-10-CM

## 2025-06-11 PROCEDURE — G2211 COMPLEX E/M VISIT ADD ON: HCPCS | Performed by: FAMILY MEDICINE

## 2025-06-11 PROCEDURE — 99214 OFFICE O/P EST MOD 30 MIN: CPT | Performed by: FAMILY MEDICINE

## 2025-06-11 PROCEDURE — G0439 PPPS, SUBSEQ VISIT: HCPCS | Performed by: FAMILY MEDICINE

## 2025-06-11 RX ORDER — VORICONAZOLE 200 MG/1
TABLET, FILM COATED ORAL
COMMUNITY

## 2025-06-11 NOTE — ASSESSMENT & PLAN NOTE
This may be multifactorial in nature.  See above, she does have bronchiectasis.  She appears to have a chronic postnasal drip and hoarseness of voice, so allergies / irritants may be contributing.  See below.  ?EGD

## 2025-06-11 NOTE — ASSESSMENT & PLAN NOTE
She has started antifungal treatment (Fluconazole), as per [facility].  Aspergillosis antigen is positive.  Mycoplasma cultures pending.  She will follow-up at [facility].  She is starting to feel better.  Has been tested several times for Tb

## 2025-06-11 NOTE — ASSESSMENT & PLAN NOTE
Continue weightbearing exercises and vitamin D3 supplementation.  She is not interested in pursuing additional medications at present.

## 2025-06-11 NOTE — PROGRESS NOTES
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  She spends half the year in [place] with her family.  She is being worked up for bronchiectasis and chronic cough.  Most recently, she had a bronchoscopy at [facility].  Antigen for aspergillosis was positive and she has now started oral antifungal treatment.  Apparently, culture for mycoplasma is still pending.  See HPI.  Since I last saw her, her mood has improved.  She began work in [place] which helped tremendously.  She is no longer on sertraline or Remeron and is feeling well.  She is due for an AWV today.  She has recent blood work from [facility], to review today.  Assessment & Plan  Bronchiectasis without complication (HCC)  She has started antifungal treatment (Fluconazole), as per [facility].  Aspergillosis antigen is positive.  Mycoplasma cultures pending.  She will follow-up at [facility].  She is starting to feel better.  Has been tested several times for Tb       Chronic cough  This may be multifactorial in nature.  See above, she does have bronchiectasis.  She appears to have a chronic postnasal drip and hoarseness of voice, so allergies / irritants may be contributing.  See below.  ?EGD       Hoarseness of voice  Etiology is unclear.  Differential includes silent reflux, postnasal drip due to irritant or allergic rhinitis, or related to underlying lung issue.  I have asked her to trial Prilosec 20 mg daily for 3 to 4 weeks.  She will wean slowly the last week.  Initiate Allegra 60 mg daily.  She has seen ENT in the past, so we will hold off for now and follow-up in 3 weeks to review.       Senile osteoporosis  Continue weightbearing exercises and vitamin D3  "supplementation.  She is not interested in pursuing additional medications at present.       Atrophic vaginitis  Stable on Yuvafem.       Adenomatous polyp  Follow-up in 5 years, 2029.       Medicare annual wellness visit, subsequent     Labs from [place] printed and reviewed at length with patient today.  Return to office in 3 weeks.    Preventive health issues were discussed with patient, and age appropriate screening tests were ordered as noted in patient's After Visit Summary. Personalized health advice and appropriate referrals for health education or preventive services given if needed, as noted in patient's After Visit Summary.    History of Present Illness     Saw pulm in [place] June 2024 - bronchoscopy and found nothing  Being ruled out for inflammatory lung  Went back to [place]      Depression - last summer/ Went back in September and then started with onset of laryngitis and cthen coughing and then CT showing bilateral pneumonia'  Took anitbiotics, many incl levaquin 10 days (Dec 24) felt good for 3 weeks and then sx returned, more antibiotics and repeat CT scan in march with another pneumonia-  Thought it was \"\" (Cryptogenic organizing pneumonia)  Bc she was healthy, did a bronchoscopy - had it done at [place] may 9th  Came back antigen positive for aspergillous  Mycoplasma pending - she did take a Z-pack but did not feel better; levaquin does help her feel better      Compared to last summer -  1. Constant mucus in throat - ongoing -   2. Coughs up thin mucusy spit - salty  F/U with Pulm at [place]  Stopped steroid inhaler    Has not been to ENT lately but went in past  There was no evidence of silent reflux at that time    Emotionally - started working in [place] - that solved the depression  Activity is normal  Started [employer]  Started fluconazole about 2 weeks ago         Patient Care Team:  Zoe Sims MD as PCP - General (Family Medicine)  Paul Jain MD as PCP - OBGYN (Obstetrics and " Gynecology)  Zoe Sims MD as PCP - PCP-[facility] Attributed-MD Paul Roberts MD (Obstetrics and Gynecology)    Review of Systems   Constitutional:         See HPI   HENT:  Positive for postnasal drip and voice change. Negative for congestion, ear pain, mouth sores, sinus pressure and trouble swallowing.    Eyes:  Negative for discharge, redness and itching.   Respiratory:  Positive for cough. Negative for apnea, chest tightness, shortness of breath, wheezing and stridor.    Cardiovascular:  Negative for chest pain, palpitations and leg swelling.   Gastrointestinal:  Negative for abdominal distention, abdominal pain, blood in stool, constipation, diarrhea, nausea and vomiting.   Endocrine: Negative for cold intolerance and heat intolerance.   Genitourinary:  Negative for difficulty urinating, dysuria, flank pain and urgency.   Musculoskeletal:  Negative for arthralgias and myalgias.   Skin:  Negative for rash.   Neurological:  Negative for dizziness, seizures, syncope, speech difficulty, weakness, light-headedness, numbness and headaches.   Hematological:  Negative for adenopathy.   Psychiatric/Behavioral:  Negative for agitation, behavioral problems, confusion and sleep disturbance. The patient is not nervous/anxious.      Medical History Reviewed by provider this encounter:       Annual Wellness Visit Questionnaire       Health Risk Assessment:   Patient rates overall health as good. Patient feels that their physical health rating is slightly worse. Patient is satisfied with their life. Eyesight was rated as same. Hearing was rated as same. Patient feels that their emotional and mental health rating is much better. Patients states they are never, rarely angry. Patient states they are never, rarely unusually tired/fatigued. Pain experienced in the last 7 days has been none. Patient states that she has experienced no weight loss or gain in last 6 months.     Fall Risk Screening:    In the past year, patient has experienced: no history of falling in past year      Urinary Incontinence Screening:   Patient has not leaked urine accidently in the last six months.     Home Safety:  Patient does not have trouble with stairs inside or outside of their home. Patient has working smoke alarms and has working carbon monoxide detector. Home safety hazards include: none.     Nutrition:   Current diet is Regular.     Medications:   Patient is currently taking over-the-counter supplements. OTC medications include: see medication list. Patient is able to manage medications.     Activities of Daily Living (ADLs)/Instrumental Activities of Daily Living (IADLs):   Walk and transfer into and out of bed and chair?: Yes  Dress and groom yourself?: Yes    Bathe or shower yourself?: Yes    Feed yourself? Yes  Do your laundry/housekeeping?: Yes  Manage your money, pay your bills and track your expenses?: Yes  Make your own meals?: Yes    Do your own shopping?: Yes    Previous Hospitalizations:   Any hospitalizations or ED visits within the last 12 months?: No      Advance Care Planning:   Living will: Yes    Durable POA for healthcare: Yes    Advanced directive: Yes      Cognitive Screening:   Provider or family/friend/caregiver concerned regarding cognition?: No    Preventive Screenings      Cardiovascular Screening:    General: Screening Not Indicated and History Lipid Disorder      Diabetes Screening:     General: Screening Current      Colorectal Cancer Screening:     General: Screening Current      Breast Cancer Screening:     General: Screening Current      Cervical Cancer Screening:    General: Screening Not Indicated      Osteoporosis Screening:    General: Screening Not Indicated and History Osteoporosis      Abdominal Aortic Aneurysm (AAA) Screening:        General: Screening Not Indicated      Lung Cancer Screening:     General: Screening Not Indicated      Hepatitis C Screening:    General: Screening  Current    Immunizations:  - Immunizations due: Zoster (Shingrix)    Screening, Brief Intervention, and Referral to Treatment (SBIRT)     Screening  Typical number of drinks in a day: 0  Typical number of drinks in a week: 2  Interpretation: Low risk drinking behavior.    AUDIT-C Screenin) How often did you have a drink containing alcohol in the past year? 2 to 4 times a month  2) How many drinks did you have on a typical day when you were drinking in the past year? 1 to 2  3) How often did you have 6 or more drinks on one occasion in the past year? never    AUDIT-C Score: 2  Interpretation: Score 0-2 (female): Negative screen for alcohol misuse    Single Item Drug Screening:  How often have you used an illegal drug (including marijuana) or a prescription medication for non-medical reasons in the past year? never    Single Item Drug Screen Score: 0  Interpretation: Negative screen for possible drug use disorder    Social Drivers of Health     Financial Resource Strain: Low Risk  (2024)    Overall Financial Resource Strain (CARDIA)    • Difficulty of Paying Living Expenses: Not hard at all   Food Insecurity: No Food Insecurity (2025)    Nursing - Inadequate Food Risk Classification    • Worried About Running Out of Food in the Last Year: Never true    • Ran Out of Food in the Last Year: Never true   Transportation Needs: No Transportation Needs (2025)    PRAPARE - Transportation    • Lack of Transportation (Medical): No    • Lack of Transportation (Non-Medical): No   Housing Stability: Low Risk  (2025)    Housing Stability Vital Sign    • Unable to Pay for Housing in the Last Year: No    • Number of Times Moved in the Last Year: 0    • Homeless in the Last Year: No   Utilities: Not At Risk (2025)    [facility] Util[facility]    • Threatened with loss of utilities: No     No results found.    Objective   /60 (BP Location: Right arm, Patient Position: Sitting, Cuff Size: Standard)   Pulse 65   Ht  "5' 2.21\" (1.58 m)   Wt 43.1 kg (95 lb)   LMP  (LMP Unknown)   SpO2 100%   BMI 17.26 kg/m²     Physical Exam  Vitals and nursing note reviewed.   Constitutional:       General: She is not in acute distress.     Appearance: Normal appearance. She is well-developed. She is not ill-appearing, toxic-appearing or diaphoretic.   HENT:      Right Ear: Tympanic membrane normal.      Left Ear: Tympanic membrane normal.      Mouth/Throat:      Comments: Thin white mucus in posterior oropharynx    Eyes:      General: No scleral icterus.    Neck:      Vascular: No carotid bruit.     Cardiovascular:      Rate and Rhythm: Normal rate and regular rhythm.      Heart sounds: Normal heart sounds. No murmur heard.     No friction rub. No gallop.   Pulmonary:      Effort: Pulmonary effort is normal. No respiratory distress.      Breath sounds: Normal breath sounds. No stridor. No wheezing, rhonchi or rales.   Chest:      Chest wall: No tenderness.   Abdominal:      General: There is no distension.      Palpations: There is no mass.      Tenderness: There is no abdominal tenderness. There is no guarding or rebound.      Hernia: No hernia is present.     Musculoskeletal:         General: Normal range of motion.      Cervical back: Normal range of motion and neck supple.      Right lower leg: No edema.      Left lower leg: No edema.   Lymphadenopathy:      Cervical: No cervical adenopathy.     Skin:     Coloration: Skin is not jaundiced.     Neurological:      General: No focal deficit present.      Mental Status: She is alert and oriented to person, place, and time.     Psychiatric:         Mood and Affect: Mood normal.         Behavior: Behavior normal.         Thought Content: Thought content normal.         Judgment: Judgment normal.         "

## 2025-06-11 NOTE — PATIENT INSTRUCTIONS
"  Trial of OTC Prilosec 20mg daily for a month. Wean to every other day for the last week.  Can try Allegra (Plain) 60mg daily for 1-2 weeks\  Mucinex for \"mucus\"      Medicare Preventive Visit Patient Instructions  Thank you for completing your Welcome to Medicare Visit or Medicare Annual Wellness Visit today. Your next wellness visit will be due in one year (6/12/2026).  The screening/preventive services that you may require over the next 5-10 years are detailed below. Some tests may not apply to you based off risk factors and/or age. Screening tests ordered at today's visit but not completed yet may show as past due. Also, please note that scanned in results may not display below.  Preventive Screenings:  Service Recommendations Previous Testing/Comments   Colorectal Cancer Screening  * Colonoscopy    * Fecal Occult Blood Test (FOBT)/Fecal Immunochemical Test (FIT)  * Fecal DNA/Cologuard Test  * Flexible Sigmoidoscopy Age: 45-75 years old   Colonoscopy: every 10 years (may be performed more frequently if at higher risk)  OR  FOBT/FIT: every 1 year  OR  Cologuard: every 3 years  OR  Sigmoidoscopy: every 5 years  Screening may be recommended earlier than age 45 if at higher risk for colorectal cancer. Also, an individualized decision between you and your healthcare provider will decide whether screening between the ages of 76-85 would be appropriate. Colonoscopy: 02/05/2024  FOBT/FIT: Not on file  Cologuard: Not on file  Sigmoidoscopy: Not on file          Breast Cancer Screening Age: 40+ years old  Frequency: every 1-2 years  Not required if history of left and right mastectomy Mammogram: 07/23/2024        Cervical Cancer Screening Between the ages of 21-29, pap smear recommended once every 3 years.   Between the ages of 30-65, can perform pap smear with HPV co-testing every 5 years.   Recommendations may differ for women with a history of total hysterectomy, cervical cancer, or abnormal pap smears in past. Pap " Smear: 08/19/2024        Hepatitis C Screening Once for adults born between 1945 and 1965  More frequently in patients at high risk for Hepatitis C Hep C Antibody: 01/16/2024        Diabetes Screening 1-2 times per year if you're at risk for diabetes or have pre-diabetes Fasting glucose: 93 mg/dL (5/22/2025)  A1C: 5.3 (8/21/2024)      Cholesterol Screening Once every 5 years if you don't have a lipid disorder. May order more often based on risk factors. Lipid panel: 07/15/2024          Other Preventive Screenings Covered by Medicare:  Abdominal Aortic Aneurysm (AAA) Screening: covered once if your at risk. You're considered to be at risk if you have a family history of AAA.  Lung Cancer Screening: covers low dose CT scan once per year if you meet all of the following conditions: (1) Age 55-77; (2) No signs or symptoms of lung cancer; (3) Current smoker or have quit smoking within the last 15 years; (4) You have a tobacco smoking history of at least 20 pack years (packs per day multiplied by number of years you smoked); (5) You get a written order from a healthcare provider.  Glaucoma Screening: covered annually if you're considered high risk: (1) You have diabetes OR (2) Family history of glaucoma OR (3)  aged 50 and older OR (4)  American aged 65 and older  Osteoporosis Screening: covered every 2 years if you meet one of the following conditions: (1) You're estrogen deficient and at risk for osteoporosis based off medical history and other findings; (2) Have a vertebral abnormality; (3) On glucocorticoid therapy for more than 3 months; (4) Have primary hyperparathyroidism; (5) On osteoporosis medications and need to assess response to drug therapy.   Last bone density test (DXA Scan): 06/02/2022.  HIV Screening: covered annually if you're between the age of 15-65. Also covered annually if you are younger than 15 and older than 65 with risk factors for HIV infection. For pregnant patients, it  is covered up to 3 times per pregnancy.    Immunizations:  Immunization Recommendations   Influenza Vaccine Annual influenza vaccination during flu season is recommended for all persons aged >= 6 months who do not have contraindications   Pneumococcal Vaccine   * Pneumococcal conjugate vaccine = PCV13 (Prevnar 13), PCV15 (Vaxneuvance), PCV20 (Prevnar 20)  * Pneumococcal polysaccharide vaccine = PPSV23 (Pneumovax) Adults 19-65 yo with certain risk factors or if 65+ yo  If never received any pneumonia vaccine: recommend Prevnar 20 (PCV20)  Give PCV20 if previously received 1 dose of PCV13 or PPSV23   Hepatitis B Vaccine 3 dose series if at intermediate or high risk (ex: diabetes, end stage renal disease, liver disease)   Respiratory syncytial virus (RSV) Vaccine - COVERED BY MEDICARE PART D  * RSVPreF3 (Arexvy) CDC recommends that adults 60 years of age and older may receive a single dose of RSV vaccine using shared clinical decision-making (SCDM)   Tetanus (Td) Vaccine - COST NOT COVERED BY MEDICARE PART B Following completion of primary series, a booster dose should be given every 10 years to maintain immunity against tetanus. Td may also be given as tetanus wound prophylaxis.   Tdap Vaccine - COST NOT COVERED BY MEDICARE PART B Recommended at least once for all adults. For pregnant patients, recommended with each pregnancy.   Shingles Vaccine (Shingrix) - COST NOT COVERED BY MEDICARE PART B  2 shot series recommended in those 19 years and older who have or will have weakened immune systems or those 50 years and older     Health Maintenance Due:      Topic Date Due    Breast Cancer Screening: Mammogram  07/23/2025    Colorectal Cancer Screening  02/03/2029    Hepatitis C Screening  Completed     Immunizations Due:      Topic Date Due    COVID-19 Vaccine (8 - 2024-25 season) 09/01/2024    IPV Vaccine (2 of 3 - Adult catch-up series) 12/10/2024     Advance Directives   What are advance directives?  Advance directives  are legal documents that state your wishes and plans for medical care. These plans are made ahead of time in case you lose your ability to make decisions for yourself. Advance directives can apply to any medical decision, such as the treatments you want, and if you want to donate organs.   What are the types of advance directives?  There are many types of advance directives, and each state has rules about how to use them. You may choose a combination of any of the following:  Living will:  This is a written record of the treatment you want. You can also choose which treatments you do not want, which to limit, and which to stop at a certain time. This includes surgery, medicine, IV fluid, and tube feedings.   Durable power of  for healthcare (DPAHC):  This is a written record that states who you want to make healthcare choices for you when you are unable to make them for yourself. This person, called a proxy, is usually a family member or a friend. You may choose more than 1 proxy.  Do not resuscitate (DNR) order:  A DNR order is used in case your heart stops beating or you stop breathing. It is a request not to have certain forms of treatment, such as CPR. A DNR order may be included in other types of advance directives.  Medical directive:  This covers the care that you want if you are in a coma, near death, or unable to make decisions for yourself. You can list the treatments you want for each condition. Treatment may include pain medicine, surgery, blood transfusions, dialysis, IV or tube feedings, and a ventilator (breathing machine).  Values history:  This document has questions about your views, beliefs, and how you feel and think about life. This information can help others choose the care that you would choose.  Why are advance directives important?  An advance directive helps you control your care. Although spoken wishes may be used, it is better to have your wishes written down. Spoken wishes can  be misunderstood, or not followed. Treatments may be given even if you do not want them. An advance directive may make it easier for your family to make difficult choices about your care.   Underweight  Underweight is defined as having a body mass index (BMI) of less than 18.5 kg/m2   Anorexia  is a loss of appetite, decreased food intake, or both. Your appetite naturally decreases as you get older. You also get full faster than you used to. This occurs because your body needs less energy. Other body changes can also lead to a decreased appetite. Even though some appetite loss is normal, you still need to get enough calories and nutrients to keep you healthy. You can start to lose too much weight if you do not eat as much food as your body needs. Unwanted weight loss can cause health problems, or worsen health problems you already have. You can also become dehydrated if you do not drink enough liquid.  How to eat healthy and get enough nutrients:   Choose healthy foods.  Eat a variety of fruits, vegetables, whole grains, low-fat dairy foods, lean meats, and other protein foods. Limit foods high in fat, sugar, and salt. Limit or avoid alcohol as directed. Work with a dietitian to help you plan your meals if you need to follow a special diet. A dietitian can also teach you how to modify foods if you have trouble chewing or swallowing.   Snack on healthy foods between meals  if you only eat a small amount during meals. Snacks provide extra healthy nutrients and calories between meals. Examples include fruit, cheese, and whole grain crackers.   Drink liquids as directed  to avoid dehydration. Drink liquids between meals if they cause you to get full too quickly during meals. Ask how much liquid to drink each day and which liquids are best for you.   Use herbs, spices, and flavor enhancers to add flavor to foods.  Avoid using herbs and spice blends that also contain sodium. Ask your healthcare provider or dietitian about  flavor enhancers. Flavor enhancers with ham, natural gold, and roast beef flavors can also be sprinkled on food to add flavor.   Share meals with others as often as you can.  Eating with others may help you to eat better during meal time. Ask family members, neighbors, or friends to join you for lunch. There are also senior centers where you can meet people, and share meals with them.   Ask family and friends for help  with shopping or preparing foods. Ask for a ride to the grocery store, if needed.       © Copyright [employer] 2018 Information is for End User's use only and may not be sold, redistributed or otherwise used for commercial purposes. All illustrations and images included in CareNotes® are the copyrighted property of [employer].D.A[employer]M., Inc[employer] or [employer]

## 2025-06-12 ENCOUNTER — ANNUAL EXAM (OUTPATIENT)
Dept: OBGYN CLINIC | Facility: CLINIC | Age: 65
End: 2025-06-12
Payer: COMMERCIAL

## 2025-06-12 VITALS
WEIGHT: 95.6 LBS | SYSTOLIC BLOOD PRESSURE: 108 MMHG | BODY MASS INDEX: 17.59 KG/M2 | HEIGHT: 62 IN | DIASTOLIC BLOOD PRESSURE: 66 MMHG

## 2025-06-12 DIAGNOSIS — N95.2 ATROPHIC VAGINITIS: ICD-10-CM

## 2025-06-12 DIAGNOSIS — Z01.419 ENCOUNTER FOR GYNECOLOGICAL EXAMINATION WITHOUT ABNORMAL FINDING: Primary | ICD-10-CM

## 2025-06-12 PROCEDURE — G0101 CA SCREEN;PELVIC/BREAST EXAM: HCPCS | Performed by: OBSTETRICS & GYNECOLOGY

## 2025-06-12 RX ORDER — ESTRADIOL 10 UG/1
1 TABLET, FILM COATED VAGINAL 2 TIMES WEEKLY
Qty: 26 TABLET | Refills: 3 | Status: SHIPPED | OUTPATIENT
Start: 2025-06-12

## 2025-06-13 NOTE — ASSESSMENT & PLAN NOTE
Orders:    estradiol (Yuvafem) 10 MCG TABS vaginal tablet; Insert 1 tablet (10 mcg total) into the vagina 2 (two) times a week

## 2025-06-13 NOTE — PROGRESS NOTES
Annual Wellness Visit  Name: Chinyere Crystal      : 1960      MRN: [id number]  Encounter Provider: Ava Raines MD  Encounter Date: 2025   Encounter department: [facility]    65 y.o.  yo presents today for her annual exam.:  Assessment & Plan  Encounter for gynecological examination without abnormal finding  Pap up to date  Mammogram scheduled  Colonoscopy due   DEXA done 3/2025 in [facility] - will discuss treatment options with Dr Chinchilla given her ongoing pulmonary issues  Patient to return for annual or PRN.        Atrophic vaginitis    Orders:    estradiol (Yuvafem) 10 MCG TABS vaginal tablet; Insert 1 tablet (10 mcg total) into the vagina 2 (two) times a week             History of Present Illness     Chinyere Crystal is a 65 y.o. female who presents for annual well woman exam.    She was previously a patient of Dr Sweeney.  She had been living in [facility] until recently when she began experiencing lung problems.  She states the access to health care in [facility] is very different than here, so she returned as she was not confident in their ability to determine what was going on.  She is seeing a pulmonologist at [place].  She is uncertain if she should proceed with treatment for osteoporosis given the potential impact on her immune system.  Encouraged her to discuss with Dr Chinchilla.      GYN:  denies vaginal discharge, labial erythema or lesions, dyspareunia.  Menses absent  Contraception: n/a.  Patient is sexually active with male partner.  No hx gynecologic surgeries.    OB:   female    :  denies dysuria, urinary frequency or urgency.  denies hematuria, flank pain, incontinence.  denies constipation, diarrhea    Breast:  denies breast mass, skin changes, dimpling, reddening, nipple retraction.  denies breast discharge.  Patient does not have a family history of breast, endometrial, or ovarian ca.     General:  ETOH use: 1  "drink/wk  Tobacco use: denies  Recreational drug use: denies    Screening:  Cervical cancer: last pap smear in 7/25/2022. Results were normal/negative HPV.  Breast cancer: last mammogram in 7/23/2024. Results were BIRAD-1.  Colon cancer: last colonoscopy in 2/5/2024. Results were 5 years.  Depression screening: negative     Review of Systems as per HPI  Medical History Reviewed by provider this encounter:  Tobacco  Allergies  Meds  Problems  Med Hx  Surg Hx  Fam Hx     .     Objective   /66 (BP Location: Left arm, Patient Position: Sitting, Cuff Size: Adult)   Ht 5' 2.21\" (1.58 m)   Wt 43.4 kg (95 lb 9.6 oz)   LMP  (LMP Unknown)   BMI 17.37 kg/m²      Physical Exam  Constitutional:       Appearance: Normal appearance. She is well-developed.   Neck:      Thyroid: No thyromegaly.   Chest:   Breasts:     Breasts are symmetrical.      Right: Normal. No swelling, bleeding, inverted nipple, mass, nipple discharge, skin change or tenderness.      Left: Normal. No swelling, bleeding, inverted nipple, mass, nipple discharge, skin change or tenderness.   Genitourinary:     General: Normal vulva.      Labia:         Right: No rash, tenderness or lesion.         Left: No rash, tenderness or lesion.       Urethra: No prolapse, urethral pain, urethral swelling or urethral lesion.      Vagina: Normal.      Cervix: Normal.      Uterus: Normal. Not enlarged, not fixed and not tender.       Adnexa: Right adnexa normal and left adnexa normal.        Right: No mass or tenderness.          Left: No mass or tenderness.       Musculoskeletal:      Cervical back: Neck supple.   Lymphadenopathy:      Cervical: No cervical adenopathy.      Upper Body:      Right upper body: No axillary adenopathy.      Left upper body: No axillary adenopathy.     Neurological:      Mental Status: She is alert.             "

## 2025-06-16 ENCOUNTER — APPOINTMENT (OUTPATIENT)
Dept: LAB | Facility: MEDICAL CENTER | Age: 65
End: 2025-06-16
Payer: COMMERCIAL

## 2025-06-16 DIAGNOSIS — J47.9 BRONCHIECTASIS WITHOUT ACUTE EXACERBATION (HCC): ICD-10-CM

## 2025-06-16 LAB
ALBUMIN SERPL BCG-MCNC: 4.8 G/DL (ref 3.5–5)
ALP SERPL-CCNC: 84 U/L (ref 34–104)
ALT SERPL W P-5'-P-CCNC: 21 U/L (ref 7–52)
ANION GAP SERPL CALCULATED.3IONS-SCNC: 9 MMOL/L (ref 4–13)
AST SERPL W P-5'-P-CCNC: 30 U/L (ref 13–39)
BILIRUB SERPL-MCNC: 0.46 MG/DL (ref 0.2–1)
BUN SERPL-MCNC: 20 MG/DL (ref 5–25)
CALCIUM SERPL-MCNC: 9.7 MG/DL (ref 8.4–10.2)
CHLORIDE SERPL-SCNC: 99 MMOL/L (ref 96–108)
CO2 SERPL-SCNC: 29 MMOL/L (ref 21–32)
CREAT SERPL-MCNC: 0.83 MG/DL (ref 0.6–1.3)
GFR SERPL CREATININE-BSD FRML MDRD: 73 ML/MIN/1.73SQ M
GLUCOSE P FAST SERPL-MCNC: 107 MG/DL (ref 65–99)
POTASSIUM SERPL-SCNC: 3.9 MMOL/L (ref 3.5–5.3)
PROT SERPL-MCNC: 7.5 G/DL (ref 6.4–8.4)
SODIUM SERPL-SCNC: 137 MMOL/L (ref 135–147)

## 2025-06-16 PROCEDURE — 80053 COMPREHEN METABOLIC PANEL: CPT

## 2025-06-16 PROCEDURE — 36415 COLL VENOUS BLD VENIPUNCTURE: CPT

## 2025-06-17 DIAGNOSIS — J47.9 BRONCHIECTASIS WITHOUT COMPLICATION (HCC): Primary | ICD-10-CM

## 2025-06-17 DIAGNOSIS — R49.0 HOARSENESS OF VOICE: ICD-10-CM

## 2025-06-17 DIAGNOSIS — R05.3 CHRONIC COUGH: ICD-10-CM

## 2025-07-01 ENCOUNTER — APPOINTMENT (OUTPATIENT)
Dept: LAB | Facility: MEDICAL CENTER | Age: 65
End: 2025-07-01
Payer: COMMERCIAL

## 2025-07-01 DIAGNOSIS — J47.9 BRONCHIECTASIS WITHOUT ACUTE EXACERBATION (HCC): ICD-10-CM

## 2025-07-01 LAB
ALBUMIN SERPL BCG-MCNC: 4.5 G/DL (ref 3.5–5)
ALP SERPL-CCNC: 74 U/L (ref 34–104)
ALT SERPL W P-5'-P-CCNC: 18 U/L (ref 7–52)
ANION GAP SERPL CALCULATED.3IONS-SCNC: 7 MMOL/L (ref 4–13)
AST SERPL W P-5'-P-CCNC: 24 U/L (ref 13–39)
BILIRUB SERPL-MCNC: 0.49 MG/DL (ref 0.2–1)
BUN SERPL-MCNC: 21 MG/DL (ref 5–25)
CALCIUM SERPL-MCNC: 9.5 MG/DL (ref 8.4–10.2)
CHLORIDE SERPL-SCNC: 100 MMOL/L (ref 96–108)
CO2 SERPL-SCNC: 31 MMOL/L (ref 21–32)
CREAT SERPL-MCNC: 0.74 MG/DL (ref 0.6–1.3)
GFR SERPL CREATININE-BSD FRML MDRD: 84 ML/MIN/1.73SQ M
GLUCOSE P FAST SERPL-MCNC: 107 MG/DL (ref 65–99)
POTASSIUM SERPL-SCNC: 4.3 MMOL/L (ref 3.5–5.3)
PROT SERPL-MCNC: 6.8 G/DL (ref 6.4–8.4)
SODIUM SERPL-SCNC: 138 MMOL/L (ref 135–147)

## 2025-07-01 PROCEDURE — 80053 COMPREHEN METABOLIC PANEL: CPT

## 2025-07-01 PROCEDURE — 36415 COLL VENOUS BLD VENIPUNCTURE: CPT

## 2025-07-02 ENCOUNTER — OFFICE VISIT (OUTPATIENT)
Dept: FAMILY MEDICINE CLINIC | Facility: CLINIC | Age: 65
End: 2025-07-02
Payer: COMMERCIAL

## 2025-07-02 VITALS
HEART RATE: 71 BPM | TEMPERATURE: 98.6 F | DIASTOLIC BLOOD PRESSURE: 60 MMHG | BODY MASS INDEX: 16.93 KG/M2 | HEIGHT: 62 IN | OXYGEN SATURATION: 98 % | SYSTOLIC BLOOD PRESSURE: 98 MMHG | WEIGHT: 92 LBS | RESPIRATION RATE: 18 BRPM

## 2025-07-02 DIAGNOSIS — B44.9 ASPERGILLOSIS (HCC): ICD-10-CM

## 2025-07-02 DIAGNOSIS — J47.9 BRONCHIECTASIS WITHOUT COMPLICATION (HCC): Primary | ICD-10-CM

## 2025-07-02 DIAGNOSIS — J47.9 BRONCHIECTASIS, NON-TUBERCULOUS (HCC): ICD-10-CM

## 2025-07-02 DIAGNOSIS — R05.3 CHRONIC COUGH: ICD-10-CM

## 2025-07-02 DIAGNOSIS — F32.A ANXIETY AND DEPRESSION: ICD-10-CM

## 2025-07-02 DIAGNOSIS — F41.9 ANXIETY AND DEPRESSION: ICD-10-CM

## 2025-07-02 PROCEDURE — G2211 COMPLEX E/M VISIT ADD ON: HCPCS | Performed by: FAMILY MEDICINE

## 2025-07-02 PROCEDURE — 99214 OFFICE O/P EST MOD 30 MIN: CPT | Performed by: FAMILY MEDICINE

## 2025-07-02 RX ORDER — PERPHENAZINE 16 MG
TABLET ORAL
COMMUNITY
Start: 2025-06-27

## 2025-07-02 RX ORDER — LORAZEPAM 0.5 MG/1
TABLET ORAL
Qty: 30 TABLET | Refills: 0 | Status: SHIPPED | OUTPATIENT
Start: 2025-07-02

## 2025-07-02 NOTE — ASSESSMENT & PLAN NOTE
Being managed and treated by [facility]  Culture recently grew nontuberculous mycoplasma.  Culture also grew Aspergillus.  For now she remains on antifungal.  Further treatment is pending as she will be attending their NTM clinic later this month.  She continues on positive airway pressure device BID.  Perhaps she may benefit from respiratory physical therapy, high frequency chest wall oscillation, and nebulized hypertonic saline - all to help remove mucus. Antimicrobial therapy will be discussed at her clinic appt.  She will likely require regular sputum cultures to track response.

## 2025-07-02 NOTE — PROGRESS NOTES
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  Today she reports feeling totally overwhelmed, worried and anxious about her physical ailments as well as her social and family situation.  She states that is hard to concentrate and she constantly thinks about her lung condition and her future health.  She finds it difficult to make decisions and is overwhelmed by her many medical specialists and plan for future intervention.  Her anxiety about her breathing is exacerbating her breathing and making her more anxious.  She is not sleeping well at night.  She does note that she can walk 3 miles without difficulty.    Since I last saw her, she saw ENT at [place]; she was scoped and told that \"everything looked okay.\".  Findings not consistent with silent reflux; no vocal cord abnormality; no evidence of chronic postnasal drip.  ENT suspects that cough and hoarseness may be pulmonary in origin.  She will have a CT of her sinuses along with CT of chest in July.    Follow-up from pulmonology consult and bronchoscopy at [place] reveals recent growth of mycobacterium, nontuberculous.  She is scheduled to follow-up with their NTM  (non-tunerculous mycobacterium) clinic on .  She is scheduled to follow-up with her pulmonologist at [place] on .    Assessment & Plan  Bronchiectasis without complication (HCC)  Being managed and treated by [facility]  Culture recently grew nontuberculous mycoplasma.  Culture also grew Aspergillus.  For now she remains on antifungal.  Further treatment is pending as she will be attending their NTM clinic later this month.  She continues on positive airway pressure device BID.  Perhaps she may benefit from respiratory physical therapy, high frequency " chest wall oscillation, and nebulized hypertonic saline - all to help remove mucus. Antimicrobial therapy will be discussed at her clinic appt.  She will likely require regular sputum cultures to track response.       Bronchiectasis, non-tuberculous (HCC)  Awaiting further intervention from [place]. See above.  She will call to see if she can be seen sooner at the NTM clinic.       Chronic cough  Secondary to above.       Aspergillosis (HCC)  [facility]  On Voriconazole       Anxiety and depression  Understandably experiencing reactive depression and anxiety due to her current situation, compounded by other family and social stressors that are making it challenging for her to concentrate on her own physical health and well-being.  She did well on sertraline 1 year ago, and discontinued after about 5 months as she was feeling better.   Emphasized need to stay on meds now for at least a year.  Lorazepam to help with extreme anxiety, prn.  RTO 2 weeks.  She would benefit from a therapist to help deal with several issues.    Orders:  •  sertraline (ZOLOFT) 50 mg tablet; Take 1 tablet (50 mg total) by mouth daily  •  LORazepam (Ativan) 0.5 mg tablet; Use 1 tab po daily as needed for anxiety      RTO 2 weeks.     History of Present Illness   Chief Complaint   Patient presents with   • Follow-up     Follow up on her lungs.       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  Today she reports feeling totally overwhelmed, worried and anxious about her physical ailments as well as her social and family situation.  She states that is hard to concentrate and she constantly thinks about her lung condition and her future health.  She finds it difficult to make decisions and is overwhelmed by her many medical specialists and plan for future intervention.  Her anxiety about her breathing is exacerbating her breathing and making her more anxious.  She is not sleeping  "well at night.  She does note that she can walk 3 miles without difficulty.    Since I last saw her, she saw ENT at [place]; she was scoped and told that \"everything looked okay.\".  Findings not consistent with silent reflux; no vocal cord abnormality; no evidence of chronic postnasal drip.  ENT suspects that cough and hoarseness may be pulmonary in origin.  She will have a CT of her sinuses along with CT of chest in July.    Follow-up from pulmonology consult and bronchoscopy at [place] reveals recent growth of mycobacterium, nontuberculous.  She is scheduled to follow-up with their NTM  (non-tunerculous mycobacterium) clinic on July 29.  She is scheduled to follow-up with her pulmonologist at [place] on August 4.          Review of Systems   Constitutional:  Positive for appetite change.        See HPI   HENT:  Positive for voice change. Negative for congestion, ear pain, mouth sores, sinus pressure and trouble swallowing.    Eyes:  Negative for discharge, redness and itching.   Respiratory:  Positive for cough. Negative for apnea, choking, chest tightness, shortness of breath, wheezing and stridor.    Cardiovascular:  Negative for chest pain, palpitations and leg swelling.   Gastrointestinal:  Negative for abdominal distention, abdominal pain, blood in stool, constipation, diarrhea, nausea and vomiting.   Endocrine: Negative for cold intolerance and heat intolerance.   Genitourinary:  Negative for difficulty urinating, dysuria, flank pain and urgency.   Musculoskeletal:  Negative for arthralgias and myalgias.   Skin:  Negative for rash.   Neurological:  Negative for dizziness, seizures, syncope, speech difficulty, weakness, light-headedness, numbness and headaches.   Hematological:  Negative for adenopathy.   Psychiatric/Behavioral:  Positive for decreased concentration, dysphoric mood and sleep disturbance. Negative for agitation, behavioral problems, confusion, hallucinations, self-injury and suicidal ideas. The patient " "is nervous/anxious.        Objective   BP 98/60 (BP Location: Left arm, Patient Position: Sitting, Cuff Size: Standard)   Pulse 71   Temp 98.6 °F (37 °C) (Tympanic)   Resp 18   Ht 5' 2\" (1.575 m)   Wt 41.7 kg (92 lb)   LMP  (LMP Unknown)   SpO2 98%   BMI 16.83 kg/m²      Physical Exam  Vitals and nursing note reviewed.   Constitutional:       General: She is not in acute distress.     Appearance: Normal appearance. She is well-developed. She is not ill-appearing, toxic-appearing or diaphoretic.      Comments: Well-groomed, excellent historian.  Tearful during today's visit.     Eyes:      General: No scleral icterus.    Neck:      Vascular: No carotid bruit.     Cardiovascular:      Rate and Rhythm: Normal rate and regular rhythm.      Heart sounds: Normal heart sounds. No murmur heard.     No friction rub. No gallop.   Pulmonary:      Effort: Pulmonary effort is normal. No respiratory distress.      Breath sounds: Normal breath sounds. No stridor. No wheezing, rhonchi or rales.   Chest:      Chest wall: No tenderness.     Musculoskeletal:         General: Normal range of motion.      Cervical back: Normal range of motion and neck supple.      Right lower leg: No edema.      Left lower leg: No edema.   Lymphadenopathy:      Cervical: No cervical adenopathy.     Skin:     Coloration: Skin is not jaundiced.     Neurological:      General: No focal deficit present.      Mental Status: She is alert and oriented to person, place, and time.     Psychiatric:         Behavior: Behavior normal.         Thought Content: Thought content normal.         Judgment: Judgment normal.      Comments: Tearful when talking about her current physical situation and social / family stressors.         "

## 2025-07-14 ENCOUNTER — APPOINTMENT (OUTPATIENT)
Dept: LAB | Facility: MEDICAL CENTER | Age: 65
End: 2025-07-14
Payer: COMMERCIAL

## 2025-07-14 DIAGNOSIS — J47.9 BRONCHIECTASIS WITHOUT ACUTE EXACERBATION (HCC): ICD-10-CM

## 2025-07-14 LAB
ALBUMIN SERPL BCG-MCNC: 4.3 G/DL (ref 3.5–5)
ALP SERPL-CCNC: 75 U/L (ref 34–104)
ALT SERPL W P-5'-P-CCNC: 21 U/L (ref 7–52)
ANION GAP SERPL CALCULATED.3IONS-SCNC: 9 MMOL/L (ref 4–13)
AST SERPL W P-5'-P-CCNC: 29 U/L (ref 13–39)
BILIRUB SERPL-MCNC: 0.4 MG/DL (ref 0.2–1)
BUN SERPL-MCNC: 20 MG/DL (ref 5–25)
CALCIUM SERPL-MCNC: 9 MG/DL (ref 8.4–10.2)
CHLORIDE SERPL-SCNC: 100 MMOL/L (ref 96–108)
CO2 SERPL-SCNC: 26 MMOL/L (ref 21–32)
CREAT SERPL-MCNC: 0.7 MG/DL (ref 0.6–1.3)
GFR SERPL CREATININE-BSD FRML MDRD: 89 ML/MIN/1.73SQ M
GLUCOSE SERPL-MCNC: 114 MG/DL (ref 65–140)
POTASSIUM SERPL-SCNC: 3.9 MMOL/L (ref 3.5–5.3)
PROT SERPL-MCNC: 6.6 G/DL (ref 6.4–8.4)
SODIUM SERPL-SCNC: 135 MMOL/L (ref 135–147)

## 2025-07-14 PROCEDURE — 80053 COMPREHEN METABOLIC PANEL: CPT

## 2025-07-14 PROCEDURE — 36415 COLL VENOUS BLD VENIPUNCTURE: CPT

## 2025-07-14 NOTE — ASSESSMENT & PLAN NOTE
Being managed and treated by [facility] - Dr. Michael Torres  Further treatment is pending as she will be attending their NTM clinic later this month.  She continues on positive airway pressure device 2-3 times a day. This helps her expectorate.  She will initiated saline nebs prior to treatments to try to help with expectorant.  Perhaps she may benefit from respiratory physical therapy or high frequency chest wall oscillation, - all to help remove mucus. Antimicrobial therapy will be discussed at her clinic appt.  She will likely require regular sputum cultures to track response.  Cont on voriconazole.

## 2025-07-14 NOTE — ASSESSMENT & PLAN NOTE
Started on Zoloft; lorazepam prn.  Has appointment with psychiatry.  Increase Zoloft to 100 mg daily.  May continue on lorazepam as needed.  Still looking for a counselor.

## 2025-07-15 ENCOUNTER — OFFICE VISIT (OUTPATIENT)
Dept: FAMILY MEDICINE CLINIC | Facility: CLINIC | Age: 65
End: 2025-07-15
Payer: COMMERCIAL

## 2025-07-15 VITALS
HEIGHT: 62 IN | DIASTOLIC BLOOD PRESSURE: 66 MMHG | OXYGEN SATURATION: 98 % | BODY MASS INDEX: 17.3 KG/M2 | TEMPERATURE: 97.9 F | SYSTOLIC BLOOD PRESSURE: 120 MMHG | HEART RATE: 69 BPM | WEIGHT: 94 LBS

## 2025-07-15 DIAGNOSIS — F41.9 ANXIETY AND DEPRESSION: ICD-10-CM

## 2025-07-15 DIAGNOSIS — F32.A ANXIETY AND DEPRESSION: ICD-10-CM

## 2025-07-15 DIAGNOSIS — J47.9 BRONCHIECTASIS WITHOUT COMPLICATION (HCC): Primary | ICD-10-CM

## 2025-07-15 PROCEDURE — 99213 OFFICE O/P EST LOW 20 MIN: CPT | Performed by: FAMILY MEDICINE

## 2025-07-15 PROCEDURE — G2211 COMPLEX E/M VISIT ADD ON: HCPCS | Performed by: FAMILY MEDICINE

## 2025-07-15 NOTE — PROGRESS NOTES
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  She is feeling a little better from last week.  Tolerating sertraline 50 mg daily and lorazepam as needed.  She has tried Remeron in the past but did not feel good on it.  She has also tried trazodone for sleep which she did not tolerate and did not find helpful.  She has made an appointment to meet with a psychiatrist in [place].    Assessment & Plan  Bronchiectasis without complication (HCC)  Being managed and treated by [facility] - Dr. Michael Torres  Further treatment is pending as she will be attending their NTM clinic later this month.  She continues on positive airway pressure device 2-3 times a day. This helps her expectorate.  She will initiated saline nebs prior to treatments to try to help with expectorant.  Perhaps she may benefit from respiratory physical therapy or high frequency chest wall oscillation, - all to help remove mucus. Antimicrobial therapy will be discussed at her clinic appt.  She will likely require regular sputum cultures to track response.  Cont on voriconazole.       Anxiety and depression  Started on Zoloft; lorazepam prn.  Has appointment with psychiatry.  Increase Zoloft to 100 mg daily.  May continue on lorazepam as needed.  Still looking for a counselor.       Return to office 3 weeks.       History of Present Illness   Chief Complaint   Patient presents with   • Follow-up     2 week fu - no change still feeling the same. Waiting to hear back fro Pulmonolgy and has appt with [facility].        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  She is feeling a little better from last week.  Tolerating sertraline 50 mg daily and lorazepam as needed.  She has tried Remeron in the past but did  "not feel good on it.  She has also tried trazodone for sleep which she did not tolerate and did not find helpful.  She has made an appointment to meet with a psychiatrist in [place].      Review of Systems   HENT: Negative.     Respiratory:  Positive for cough. Negative for choking, chest tightness, shortness of breath and stridor.    Gastrointestinal: Negative.    Musculoskeletal: Negative.    Skin: Negative.    Psychiatric/Behavioral:  Positive for decreased concentration, dysphoric mood and sleep disturbance. Negative for suicidal ideas. The patient is nervous/anxious.        Objective   /66 (BP Location: Right arm, Patient Position: Sitting, Cuff Size: Standard)   Pulse 69   Temp 97.9 °F (36.6 °C) (Temporal)   Ht 5' 2\" (1.575 m)   Wt 42.6 kg (94 lb)   LMP  (LMP Unknown)   SpO2 98%   BMI 17.19 kg/m²      Physical Exam  Vitals and nursing note reviewed.   Constitutional:       General: She is not in acute distress.     Appearance: Normal appearance. She is well-developed. She is not ill-appearing, toxic-appearing or diaphoretic.      Comments: Thin appearing white female in no apparent distress.     Eyes:      General: No scleral icterus.      Cardiovascular:      Rate and Rhythm: Normal rate and regular rhythm.      Heart sounds: Normal heart sounds. No murmur heard.     No friction rub. No gallop.   Pulmonary:      Effort: Pulmonary effort is normal. No respiratory distress.      Breath sounds: Normal breath sounds. No stridor. No wheezing, rhonchi or rales.      Comments: Normal air sounds, good air movement.  No wheezes rales or rhonchi.  Chest:      Chest wall: No tenderness.     Musculoskeletal:         General: Normal range of motion.      Right lower leg: No edema.      Left lower leg: No edema.     Skin:     Coloration: Skin is not jaundiced.     Neurological:      Mental Status: She is alert and oriented to person, place, and time.     Psychiatric:         Behavior: Behavior normal.    "      Thought Content: Thought content normal.         Judgment: Judgment normal.      Comments: Tearful when talking about current situation, medical issues and family stressors.

## 2025-07-16 ENCOUNTER — APPOINTMENT (OUTPATIENT)
Dept: LAB | Facility: HOSPITAL | Age: 65
End: 2025-07-16
Payer: COMMERCIAL

## 2025-07-16 DIAGNOSIS — J47.9 BRONCHIECTASIS WITHOUT ACUTE EXACERBATION (HCC): ICD-10-CM

## 2025-07-16 PROCEDURE — 87116 MYCOBACTERIA CULTURE: CPT

## 2025-07-16 PROCEDURE — 87070 CULTURE OTHR SPECIMN AEROBIC: CPT

## 2025-07-16 PROCEDURE — 87205 SMEAR GRAM STAIN: CPT

## 2025-07-16 PROCEDURE — 87206 SMEAR FLUORESCENT/ACID STAI: CPT

## 2025-07-17 LAB — RHODAMINE-AURAMINE STN SPEC: NORMAL

## 2025-07-18 LAB
BACTERIA SPT RESP CULT: ABNORMAL
GRAM STN SPEC: ABNORMAL

## 2025-07-22 LAB
MYCOBACTERIUM SPEC CULT: NORMAL
RHODAMINE-AURAMINE STN SPEC: NORMAL

## 2025-07-25 DIAGNOSIS — F41.9 ANXIETY AND DEPRESSION: ICD-10-CM

## 2025-07-25 DIAGNOSIS — F32.A ANXIETY AND DEPRESSION: ICD-10-CM

## 2025-07-26 RX ORDER — LORAZEPAM 0.5 MG/1
TABLET ORAL
Qty: 30 TABLET | Refills: 0 | Status: SHIPPED | OUTPATIENT
Start: 2025-07-26

## 2025-07-30 LAB
MYCOBACTERIUM SPEC CULT: NORMAL
RHODAMINE-AURAMINE STN SPEC: NORMAL

## 2025-08-05 PROBLEM — J30.9 ALLERGIC RHINITIS: Status: RESOLVED | Noted: 2019-02-05 | Resolved: 2025-08-05

## 2025-08-05 PROBLEM — J45.909 MILD ASTHMA WITHOUT COMPLICATION, UNSPECIFIED WHETHER PERSISTENT: Status: RESOLVED | Noted: 2024-01-23 | Resolved: 2025-08-05

## 2025-08-05 PROBLEM — R63.4 WEIGHT LOSS: Status: RESOLVED | Noted: 2024-07-13 | Resolved: 2025-08-05

## 2025-08-05 PROBLEM — A31.9 MYCOBACTERIOSIS: Status: ACTIVE | Noted: 2025-08-05

## 2025-08-05 PROBLEM — R92.30 DENSE BREAST TISSUE ON MAMMOGRAM: Status: RESOLVED | Noted: 2022-07-23 | Resolved: 2025-08-05

## 2025-08-05 PROBLEM — A31.0 PULMONARY MAI (MYCOBACTERIUM AVIUM-INTRACELLULARE) INFECTION (HCC): Status: ACTIVE | Noted: 2025-08-05

## 2025-08-05 PROBLEM — R73.9 HYPERGLYCEMIA: Status: RESOLVED | Noted: 2024-08-23 | Resolved: 2025-08-05

## 2025-08-05 PROBLEM — B44.9 ASPERGILLUS PNEUMONIA (HCC): Status: ACTIVE | Noted: 2025-08-05

## 2025-08-05 LAB
MYCOBACTERIUM SPEC CULT: NORMAL
RHODAMINE-AURAMINE STN SPEC: NORMAL

## 2025-08-05 RX ORDER — RIFABUTIN 150 MG/1
300 CAPSULE ORAL 3 TIMES WEEKLY
COMMUNITY
Start: 2025-08-01

## 2025-08-05 RX ORDER — SODIUM CHLORIDE FOR INHALATION 7 %
4 VIAL, NEBULIZER (ML) INHALATION 2 TIMES DAILY
COMMUNITY
Start: 2025-07-30

## 2025-08-05 RX ORDER — ETHAMBUTOL HYDROCHLORIDE 400 MG/1
800 TABLET, FILM COATED ORAL 3 TIMES WEEKLY
COMMUNITY
Start: 2025-08-01

## 2025-08-06 ENCOUNTER — OFFICE VISIT (OUTPATIENT)
Dept: FAMILY MEDICINE CLINIC | Facility: CLINIC | Age: 65
End: 2025-08-06
Payer: COMMERCIAL

## 2025-08-06 VITALS
OXYGEN SATURATION: 98 % | SYSTOLIC BLOOD PRESSURE: 110 MMHG | WEIGHT: 93.2 LBS | DIASTOLIC BLOOD PRESSURE: 70 MMHG | HEART RATE: 74 BPM | HEIGHT: 62 IN | BODY MASS INDEX: 17.15 KG/M2

## 2025-08-06 DIAGNOSIS — F32.A ANXIETY AND DEPRESSION: ICD-10-CM

## 2025-08-06 DIAGNOSIS — J47.9 BRONCHIECTASIS WITHOUT COMPLICATION (HCC): Primary | ICD-10-CM

## 2025-08-06 DIAGNOSIS — F41.9 ANXIETY AND DEPRESSION: ICD-10-CM

## 2025-08-06 DIAGNOSIS — B44.9 ASPERGILLOSIS (HCC): ICD-10-CM

## 2025-08-06 DIAGNOSIS — A31.0 PULMONARY MAI (MYCOBACTERIUM AVIUM-INTRACELLULARE) INFECTION (HCC): ICD-10-CM

## 2025-08-06 PROCEDURE — G2211 COMPLEX E/M VISIT ADD ON: HCPCS | Performed by: FAMILY MEDICINE

## 2025-08-06 PROCEDURE — 99214 OFFICE O/P EST MOD 30 MIN: CPT | Performed by: FAMILY MEDICINE

## 2025-08-06 RX ORDER — AZITHROMYCIN 500 MG/1
500 TABLET, FILM COATED ORAL 3 TIMES WEEKLY
COMMUNITY
Start: 2025-08-01

## 2025-08-12 ENCOUNTER — APPOINTMENT (OUTPATIENT)
Dept: LAB | Facility: HOSPITAL | Age: 65
End: 2025-08-12
Payer: COMMERCIAL

## 2025-08-12 DIAGNOSIS — A31.0 PULMONARY DISEASE DUE TO MYCOBACTERIA (HCC): ICD-10-CM

## 2025-08-12 DIAGNOSIS — J47.9 BRONCHIECTASIS WITHOUT ACUTE EXACERBATION (HCC): ICD-10-CM

## 2025-08-12 DIAGNOSIS — Z79.2 ENCOUNTER FOR LONG-TERM (CURRENT) USE OF ANTIBIOTICS: ICD-10-CM

## 2025-08-12 LAB
ALBUMIN SERPL BCG-MCNC: 4.5 G/DL (ref 3.5–5)
ALP SERPL-CCNC: 86 U/L (ref 34–104)
ALT SERPL W P-5'-P-CCNC: 29 U/L (ref 7–52)
ANION GAP SERPL CALCULATED.3IONS-SCNC: 6 MMOL/L (ref 4–13)
AST SERPL W P-5'-P-CCNC: 22 U/L (ref 13–39)
ATRIAL RATE: 78 BPM
BASOPHILS # BLD AUTO: 0.03 THOUSANDS/ÂΜL (ref 0–0.1)
BASOPHILS NFR BLD AUTO: 1 % (ref 0–1)
BILIRUB SERPL-MCNC: 0.37 MG/DL (ref 0.2–1)
BUN SERPL-MCNC: 23 MG/DL (ref 5–25)
CALCIUM SERPL-MCNC: 9.4 MG/DL (ref 8.4–10.2)
CHLORIDE SERPL-SCNC: 103 MMOL/L (ref 96–108)
CO2 SERPL-SCNC: 28 MMOL/L (ref 21–32)
CREAT SERPL-MCNC: 0.71 MG/DL (ref 0.6–1.3)
EOSINOPHIL # BLD AUTO: 0 THOUSAND/ÂΜL (ref 0–0.61)
EOSINOPHIL NFR BLD AUTO: 0 % (ref 0–6)
ERYTHROCYTE [DISTWIDTH] IN BLOOD BY AUTOMATED COUNT: 14.4 % (ref 11.6–15.1)
EST. AVERAGE GLUCOSE BLD GHB EST-MCNC: 120 MG/DL
GFR SERPL CREATININE-BSD FRML MDRD: 88 ML/MIN/1.73SQ M
GLUCOSE SERPL-MCNC: 95 MG/DL (ref 65–140)
HBA1C MFR BLD: 5.8 %
HCT VFR BLD AUTO: 38.7 % (ref 34.8–46.1)
HGB BLD-MCNC: 12.9 G/DL (ref 11.5–15.4)
IMM GRANULOCYTES # BLD AUTO: 0.03 THOUSAND/UL (ref 0–0.2)
IMM GRANULOCYTES NFR BLD AUTO: 1 % (ref 0–2)
LYMPHOCYTES # BLD AUTO: 0.28 THOUSANDS/ÂΜL (ref 0.6–4.47)
LYMPHOCYTES NFR BLD AUTO: 5 % (ref 14–44)
MCH RBC QN AUTO: 30.6 PG (ref 26.8–34.3)
MCHC RBC AUTO-ENTMCNC: 33.3 G/DL (ref 31.4–37.4)
MCV RBC AUTO: 92 FL (ref 82–98)
MONOCYTES # BLD AUTO: 0.91 THOUSAND/ÂΜL (ref 0.17–1.22)
MONOCYTES NFR BLD AUTO: 18 % (ref 4–12)
MYCOBACTERIUM SPEC CULT: NORMAL
NEUTROPHILS # BLD AUTO: 3.96 THOUSANDS/ÂΜL (ref 1.85–7.62)
NEUTS SEG NFR BLD AUTO: 75 % (ref 43–75)
NRBC BLD AUTO-RTO: 0 /100 WBCS
P AXIS: 77 DEGREES
PLATELET # BLD AUTO: 118 THOUSANDS/UL (ref 149–390)
PMV BLD AUTO: 13.5 FL (ref 8.9–12.7)
POTASSIUM SERPL-SCNC: 3.9 MMOL/L (ref 3.5–5.3)
PR INTERVAL: 148 MS
PROT SERPL-MCNC: 7 G/DL (ref 6.4–8.4)
QRS AXIS: 76 DEGREES
QRSD INTERVAL: 68 MS
QT INTERVAL: 380 MS
QTC INTERVAL: 433 MS
RBC # BLD AUTO: 4.21 MILLION/UL (ref 3.81–5.12)
RHODAMINE-AURAMINE STN SPEC: NORMAL
SODIUM SERPL-SCNC: 137 MMOL/L (ref 135–147)
T WAVE AXIS: 53 DEGREES
VENTRICULAR RATE: 78 BPM
WBC # BLD AUTO: 5.21 THOUSAND/UL (ref 4.31–10.16)

## 2025-08-12 PROCEDURE — 36415 COLL VENOUS BLD VENIPUNCTURE: CPT

## 2025-08-12 PROCEDURE — 87206 SMEAR FLUORESCENT/ACID STAI: CPT

## 2025-08-12 PROCEDURE — 93005 ELECTROCARDIOGRAM TRACING: CPT

## 2025-08-12 PROCEDURE — 87116 MYCOBACTERIA CULTURE: CPT

## 2025-08-12 PROCEDURE — 80053 COMPREHEN METABOLIC PANEL: CPT

## 2025-08-12 PROCEDURE — 83036 HEMOGLOBIN GLYCOSYLATED A1C: CPT

## 2025-08-12 PROCEDURE — 85025 COMPLETE CBC W/AUTO DIFF WBC: CPT

## 2025-08-13 LAB — RHODAMINE-AURAMINE STN SPEC: NORMAL

## 2025-08-14 ENCOUNTER — OFFICE VISIT (OUTPATIENT)
Dept: FAMILY MEDICINE CLINIC | Facility: CLINIC | Age: 65
End: 2025-08-14
Payer: COMMERCIAL

## 2025-08-26 LAB
MYCOBACTERIUM SPEC CULT: NORMAL
RHODAMINE-AURAMINE STN SPEC: NORMAL